# Patient Record
Sex: MALE | URBAN - METROPOLITAN AREA
[De-identification: names, ages, dates, MRNs, and addresses within clinical notes are randomized per-mention and may not be internally consistent; named-entity substitution may affect disease eponyms.]

---

## 2017-01-19 ENCOUNTER — MEDICAL CORRESPONDENCE (OUTPATIENT)
Dept: HEALTH INFORMATION MANAGEMENT | Facility: CLINIC | Age: 32
End: 2017-01-19

## 2017-01-19 ENCOUNTER — TRANSFERRED RECORDS (OUTPATIENT)
Dept: HEALTH INFORMATION MANAGEMENT | Facility: CLINIC | Age: 32
End: 2017-01-19

## 2017-02-02 ENCOUNTER — PRE VISIT (OUTPATIENT)
Dept: ORTHOPEDICS | Facility: CLINIC | Age: 32
End: 2017-02-02

## 2017-02-02 NOTE — TELEPHONE ENCOUNTER
1.  Date/reason for appt: 2/13/17 -- lateral patella instability  2.  Referring provider: Angel Espinosa  3.  Call to patient (Yes / No - short description): no, referred  4.  Previous care at / records requested from: [place] -- per referral , records already forwarded to [facility].

## 2017-02-02 NOTE — TELEPHONE ENCOUNTER
12/13/16 note received from [facility], will forward to clinic.  Xray left knee on 3/22/16- [facility]     Additional records at [facility] with Dr. Herrera. Patient has had a arthroscopy.

## 2017-02-03 NOTE — TELEPHONE ENCOUNTER
Called and spoke to pt, he has been seen at [facility] prior to [facility].  Mailed KYE to pt's home address.

## 2017-02-08 NOTE — TELEPHONE ENCOUNTER
Called and spoke to pt to follow up on KYE form, states he did not receive it yet.  He will try to get it back to me before appt time.  If not, pt was instructed to bring form to appt.

## 2017-02-09 NOTE — TELEPHONE ENCOUNTER
Records received from [facility], will forward to clinic. Waiting for images.   Left knee op-note on 1/27/16  Office notes on 11/17/16, 3/22/16, 2/9/16, 1/4/16, 12/17/15, 12/14/15, 11/3/15, 10/23/15, 10/20/15  PT notes on 12/9/15, 12/4/15, 12/2/15, 11/25/15, 11/20/15  Labs   Xray left knee on 3/22/16, 10/19/15  MRI left knee on 10/29/15

## 2017-02-13 ENCOUNTER — OFFICE VISIT (OUTPATIENT)
Dept: ORTHOPEDICS | Facility: CLINIC | Age: 32
End: 2017-02-13

## 2017-02-13 VITALS — WEIGHT: 182 LBS | BODY MASS INDEX: 30.32 KG/M2 | HEIGHT: 65 IN

## 2017-02-13 DIAGNOSIS — M25.562 CHRONIC PAIN OF LEFT KNEE: Primary | ICD-10-CM

## 2017-02-13 DIAGNOSIS — G89.29 CHRONIC PAIN OF LEFT KNEE: Primary | ICD-10-CM

## 2017-02-13 ASSESSMENT — ENCOUNTER SYMPTOMS
JOINT SWELLING: 0
RESPIRATORY PAIN: 0
STIFFNESS: 1
DYSPNEA ON EXERTION: 0
MYALGIAS: 1
POSTURAL DYSPNEA: 0
COUGH: 1
NECK PAIN: 0
MUSCLE CRAMPS: 0
SHORTNESS OF BREATH: 0
SPUTUM PRODUCTION: 1
ARTHRALGIAS: 1
BACK PAIN: 1
WHEEZING: 0
COUGH DISTURBING SLEEP: 0
HEMOPTYSIS: 0
SNORES LOUDLY: 1
MUSCLE WEAKNESS: 0

## 2017-02-13 NOTE — NURSING NOTE
"Reason For Visit:   Chief Complaint   Patient presents with     Musculoskeletal Problem     Left knee, consideration for ACI procedure, DOS: 1/27/16 Dr Myah Herrera for patellar instability     Occupation: .  Currently working? Yes.  Work status?  Full time.  Date of injury: History of chronic patellar dislocations    Date of surgery: 1/27/16  Type of surgery: arthroscopy, chondroplasty, cartilage biopsy  Smoker: Yes    Pain Assessment  Patient Currently in Pain: Yes  Primary Pain Location: Knee  Pain Orientation: Left  Pain Descriptors: Constant  Aggravating Factors: Walking    Ht 1.651 m (5' 5\")  Wt 82.6 kg (182 lb)  BMI 30.29 kg/m2    No current outpatient prescriptions on file.     No current facility-administered medications for this visit.                                                       "

## 2017-02-13 NOTE — PROGRESS NOTES
REFERRING PROVIDER:  Angel Espinosa MD.      CHIEF COMPLAINT:     1.  Left-sided knee pain.   2.  Left patellar instability.      HISTORY OF PRESENT ILLNESS:  Mr. Munir Bolton is a 31-year-old male with a history of knee pain that is secondary to patellar instability.  He had been treated by Dr. Gladis Mendoza in 01/2017 and then at that time had a chondroplasty and ACI biopsy harvest.  At that time there was roughly 2 square cm with grade 3 changes in his patella.  After that, he seemed to do okay for a short time and then underwent multiple dislocations since then.  He has difficulty doing his job secondary to instability or pain.  He then saw Dr. Espinosa who recommended an arthroscopy with the tibial tubercle osteotomy plus/minus MPFL reconstruction and ACI implantation at the undersurface of the patella.      Munir was referred here secondary to insurance reasons.  In the interval time between seeing Dr. Espinosa, he has stopped smoking.  He had questions today regarding the treatment plan and surgery, postoperative plan and his ability to return to work as he works as a .      PAST MEDICAL HISTORY:  Includes a history of Hodgkin's lymphoma that is in remission.      PAST SURGICAL HISTORY:  Includes a port placement and removal.      SOCIAL HISTORY:  Lives in [place] outside of [place].  He works as a ; he has quit smoking in November.      FAMILY HISTORY:  Noncontributory.      REVIEW OF SYSTEMS:  Positive for left knee joint pain and swelling, negative for chest pain, shortness of breath, nausea, vomiting, no numbness or tingling or weight loss.  The remainder of review of systems is negative.      PHYSICAL EXAMINATION:  Examination of the left knee shows skin nondiaphoretic, nonlabored breathing.  Examination of the left knee shows portals that are well-healed from surgery, positive J sign, with extension, crepitus apprehension with attempted displacement of the  patella.  Flexion is greater than 120 degrees, stable to varus and valgus stress and anterior and posterior drawer and Lachman's testing.       IMAGING:  Review of the x-rays does show mild patella dianna.  Review of the MRI from 2015 shows a significant defect to the undersurface of the patellar cartilage with some mild trochlear dysplasia and an intermediate range TT-TG around 18.      ASSESSMENT:  Mr. Bolton is a 31-year-old male with recurrent patellar instability with cartilage damage to the undersurface of the patella.  He also has patella dianna.  At this point, the plan outlined by Dr. Espinosa seems very reasonable.  We find that we would like to get an MRI to make sure he is a surgical candidate for ACI.  We understand that he may have difficulty getting this approved by insurance, but we will do our best to work on getting this approved for him.  If this is not able be improved due to the high cost of ACI, we may be able to come up with an alternative plan for cartilage restoration.      We will plan on trying to get an MRI today with 3T magnum and then discuss results with the patient over the phone so he does not have to make the total 9 hour commute to come her and back.  We can then discuss any further treatment plan going forward.      The patient was seen and examined with Dr. Connors.      cc: Angel Espinosa MD

## 2017-02-13 NOTE — MR AVS SNAPSHOT
"              After Visit Summary   2017    Munir Bolton    MRN: [id number]           Patient Information     Date Of Birth          1985        Visit Information        Provider Department      2017 12:30 PM Angelo Connors MD [facility]        Today's Diagnoses     Chronic pain of left knee    -  1       Follow-ups after your visit        Who to contact     Please call your clinic at [phone] to:    Ask questions about your health    Make or cancel appointments    Discuss your medicines    Learn about your test results    Speak to your doctor   If you have compliments or concerns about an experience at your clinic, or if you wish to file a complaint, please contact [facility] Patient Relations at [phone] or email us at [email or web address]         Additional Information About Your Visit        MyChart Information     Fixmo is an electronic gateway that provides easy, online access to your medical records. With Fixmo, you can request a clinic appointment, read your test results, renew a prescription or communicate with your care team.     To sign up for [employer] visit the website at [email or web address]   You will be asked to enter the access code listed below, as well as some personal information. Please follow the directions to create your username and password.     Your access code is: [email or web address]  Expires: 2017  6:30 AM     Your access code will  in 90 days. If you need help or a new code, please contact your [facility] or call [phone] for assistance.        Care EveryWhere ID     This is your Care EveryWhere ID. This could be used by other organizations to access your [facility] medical records  [id number]        Your Vitals Were     Height BMI (Body Mass Index)                1.651 m (5' 5\") 30.29 kg/m2           Blood Pressure from Last 3 Encounters:   No " data found for BP    Weight from Last 3 Encounters:   02/13/17 82.6 kg (182 lb)              We Performed the Following     [facility]-Operative Worksheet        Primary Care Provider    None Specified       No primary provider on file.        Thank you!     Thank you for choosing [facility]  for your care. Our goal is always to provide you with excellent care. Hearing back from our patients is one way we can continue to improve our services. Please take a few minutes to complete the written survey that you may receive in the mail after your visit with us. Thank you!             Your Updated Medication List - Protect others around you: Learn how to safely use, store and throw away your medicines at www.disposemymeds.org.      Notice  As of 2/13/2017 11:59 PM    You have not been prescribed any medications.

## 2017-02-13 NOTE — PROGRESS NOTES
Patient seen and examined. Agree with history, physical and imaging as well as Assessment and Plan as detailed by Dr. Hill.    Assesment:  1. Recurrent patellar instability    2. 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 TTTG 18    4. Hardy - IS 1.44; CD 1.125    Plan: New mri to confirm candidacy of cartilage wear    Tenative surgical plan will be: EUA, arthroscopy, mpfl reconstruction with allograft, +/- LRL, TTO, ACI implantation    I was present with the resident during the history and exam.  I discussed the case with the resident and agree with the findings as documented in the assessment and plan.      ADDENDUM:    MRI obtained which shows full thickness cartilage loss to the patella. Will plan to proceed with the surgical plan as described above.

## 2017-02-13 NOTE — LETTER
2/13/2017      RE: Munir Bolton  [address] [address] [address]       Patient seen and examined. Agree with history, physical and imaging as well as Assessment and Plan as detailed by Dr. Hill.    Assesment:  1. Recurrent patellar instability    2. 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 TTTG 18    4. Diamond - IS 1.44; CD 1.125    Plan: New mri to confirm candidacy of cartilage wear    Tenative surgical plan will be: EUA, arthroscopy, mpfl reconstruction with allograft, +/- LRL, TTO, ACI implantation    I was present with the resident during the history and exam.  I discussed the case with the resident and agree with the findings as documented in the assessment and plan.      ADDENDUM:    MRI obtained which shows full thickness cartilage loss to the patella. Will plan to proceed with the surgical plan as described above.    REFERRING PROVIDER:  Angel Espinosa MD.      CHIEF COMPLAINT:     1.  Left-sided knee pain.   2.  Left patellar instability.      HISTORY OF PRESENT ILLNESS:  Mr. Munir Bolton is a 31-year-old male with a history of knee pain that is secondary to patellar instability.  He had been treated by Dr. Gladis Mendoza in 01/2017 and then at that time had a chondroplasty and ACI biopsy harvest.  At that time there was roughly 2 square cm with grade 3 changes in his patella.  After that, he seemed to do okay for a short time and then underwent multiple dislocations since then.  He has difficulty doing his job secondary to instability or pain.  He then saw Dr. Espinosa who recommended an arthroscopy with the tibial tubercle osteotomy plus/minus MPFL reconstruction and ACI implantation at the undersurface of the patella.      Munir was referred here secondary to insurance reasons.  In the interval time between seeing Dr. Espinosa, he has stopped smoking.  He had questions today regarding the treatment plan and surgery, postoperative plan and his ability to return to work as he  works as a .      PAST MEDICAL HISTORY:  Includes a history of Hodgkin's lymphoma that is in remission.      PAST SURGICAL HISTORY:  Includes a port placement and removal.      SOCIAL HISTORY:  Lives in [place] outside of [place].  He works as a ; he has quit smoking in November.      FAMILY HISTORY:  Noncontributory.      REVIEW OF SYSTEMS:  Positive for left knee joint pain and swelling, negative for chest pain, shortness of breath, nausea, vomiting, no numbness or tingling or weight loss.  The remainder of review of systems is negative.      PHYSICAL EXAMINATION:  Examination of the left knee shows skin nondiaphoretic, nonlabored breathing.  Examination of the left knee shows portals that are well-healed from surgery, positive J sign, with extension, crepitus apprehension with attempted displacement of the patella.  Flexion is greater than 120 degrees, stable to varus and valgus stress and anterior and posterior drawer and Lachman's testing.       IMAGING:  Review of the x-rays does show mild patella dianna.  Review of the MRI from 2015 shows a significant defect to the undersurface of the patellar cartilage with some mild trochlear dysplasia and an intermediate range TT-TG around 18.      ASSESSMENT:  Mr. Bolton is a 31-year-old male with recurrent patellar instability with cartilage damage to the undersurface of the patella.  He also has patella dianna.  At this point, the plan outlined by Dr. Espinosa seems very reasonable.  We find that we would like to get an MRI to make sure he is a surgical candidate for ACI.  We understand that he may have difficulty getting this approved by insurance, but we will do our best to work on getting this approved for him.  If this is not able be improved due to the high cost of ACI, we may be able to come up with an alternative plan for cartilage restoration.      We will plan on trying to get an MRI today with 3T magnum and then discuss  results with the patient over the phone so he does not have to make the total 9 hour commute to come her and back.  We can then discuss any further treatment plan going forward.      The patient was seen and examined with Dr. Connors.      cc: MD Angelo Miller MD

## 2017-02-13 NOTE — LETTER
2/13/2017       RE: Munir Bolton  [address] [address] [address]     Dear Colleague,    Thank you for referring your patient, Munir Bolton, to the [facility] at [facility]. Please see a copy of my visit note below.    Patient seen and examined. Agree with history, physical and imaging as well as Assessment and Plan as detailed by Dr. Hill.    Assesment:  1. Recurrent patellar instability    2. 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 TTTG 18    4. Diamond - IS 1.44; CD 1.125    Plan: New mri to confirm candidacy of cartilage wear    Tenative surgical plan will be: EUA, arthroscopy, mpfl reconstruction with allograft, +/- LRL, TTO, ACI implantation    I was present with the resident during the history and exam.  I discussed the case with the resident and agree with the findings as documented in the assessment and plan.      ADDENDUM:    MRI obtained which shows full thickness cartilage loss to the patella. Will plan to proceed with the surgical plan as described above.    REFERRING PROVIDER:  Angel Espinosa MD.      CHIEF COMPLAINT:     1.  Left-sided knee pain.   2.  Left patellar instability.      HISTORY OF PRESENT ILLNESS:  Mr. Munir Bolton is a 31-year-old male with a history of knee pain that is secondary to patellar instability.  He had been treated by Dr. Gladis Mendoza in 01/2017 and then at that time had a chondroplasty and ACI biopsy harvest.  At that time there was roughly 2 square cm with grade 3 changes in his patella.  After that, he seemed to do okay for a short time and then underwent multiple dislocations since then.  He has difficulty doing his job secondary to instability or pain.  He then saw Dr. Espinosa who recommended an arthroscopy with the tibial tubercle osteotomy plus/minus MPFL reconstruction and ACI implantation at the undersurface of the patella.      Munir was referred here secondary to insurance  reasons.  In the interval time between seeing Dr. Espinosa, he has stopped smoking.  He had questions today regarding the treatment plan and surgery, postoperative plan and his ability to return to work as he works as a .      PAST MEDICAL HISTORY:  Includes a history of Hodgkin's lymphoma that is in remission.      PAST SURGICAL HISTORY:  Includes a port placement and removal.      SOCIAL HISTORY:  Lives in [place] outside of [place].  He works as a ; he has quit smoking in November.      FAMILY HISTORY:  Noncontributory.      REVIEW OF SYSTEMS:  Positive for left knee joint pain and swelling, negative for chest pain, shortness of breath, nausea, vomiting, no numbness or tingling or weight loss.  The remainder of review of systems is negative.      PHYSICAL EXAMINATION:  Examination of the left knee shows skin nondiaphoretic, nonlabored breathing.  Examination of the left knee shows portals that are well-healed from surgery, positive J sign, with extension, crepitus apprehension with attempted displacement of the patella.  Flexion is greater than 120 degrees, stable to varus and valgus stress and anterior and posterior drawer and Lachman's testing.       IMAGING:  Review of the x-rays does show mild patella dianna.  Review of the MRI from 2015 shows a significant defect to the undersurface of the patellar cartilage with some mild trochlear dysplasia and an intermediate range TT-TG around 18.      ASSESSMENT:  Mr. Bolton is a 31-year-old male with recurrent patellar instability with cartilage damage to the undersurface of the patella.  He also has patella dianna.  At this point, the plan outlined by Dr. Espinosa seems very reasonable.  We find that we would like to get an MRI to make sure he is a surgical candidate for ACI.  We understand that he may have difficulty getting this approved by insurance, but we will do our best to work on getting this approved for him.  If this is  not able be improved due to the high cost of ACI, we may be able to come up with an alternative plan for cartilage restoration.      We will plan on trying to get an MRI today with 3T magnum and then discuss results with the patient over the phone so he does not have to make the total 9 hour commute to come her and back.  We can then discuss any further treatment plan going forward.      The patient was seen and examined with Dr. Connors.      Again, thank you for allowing me to participate in the care of your patient.      Sincerely,    Angelo Connors MD    cc: Angel Espinosa MD

## 2017-02-24 ENCOUNTER — MEDICAL CORRESPONDENCE (OUTPATIENT)
Dept: HEALTH INFORMATION MANAGEMENT | Facility: CLINIC | Age: 32
End: 2017-02-24

## 2017-03-23 ENCOUNTER — ALLIED HEALTH/NURSE VISIT (OUTPATIENT)
Dept: SURGERY | Facility: CLINIC | Age: 32
End: 2017-03-23

## 2017-03-23 ENCOUNTER — ANESTHESIA EVENT (OUTPATIENT)
Dept: SURGERY | Facility: CLINIC | Age: 32
End: 2017-03-23
Payer: COMMERCIAL

## 2017-03-23 ENCOUNTER — OFFICE VISIT (OUTPATIENT)
Dept: SURGERY | Facility: CLINIC | Age: 32
End: 2017-03-23

## 2017-03-23 VITALS
SYSTOLIC BLOOD PRESSURE: 133 MMHG | BODY MASS INDEX: 29.34 KG/M2 | HEART RATE: 83 BPM | TEMPERATURE: 99 F | HEIGHT: 67 IN | DIASTOLIC BLOOD PRESSURE: 78 MMHG | RESPIRATION RATE: 18 BRPM | OXYGEN SATURATION: 96 % | WEIGHT: 186.9 LBS

## 2017-03-23 DIAGNOSIS — M25.362 PATELLAR INSTABILITY OF LEFT KNEE: ICD-10-CM

## 2017-03-23 DIAGNOSIS — Z01.818 PREOP EXAMINATION: Primary | ICD-10-CM

## 2017-03-23 ASSESSMENT — LIFESTYLE VARIABLES: TOBACCO_USE: 1

## 2017-03-23 NOTE — PATIENT INSTRUCTIONS
Preparing for Your Surgery      Name:  Munir Bolton   MRN:  [id number]   :  1985   Today's Date:  3/23/2017     Arriving for surgery:  Surgery date:  3/24/17  Surgery time:  8:30 AM  Arrival time:  6:00 AM  Please come to:     [facility] [address]  [address] [place]  [address]    - parking is available in front of [facility] from 5:15AM to 8:00PM. If you prefer, park your car in the Green Lot.    -Proceed to the 3rd floor, check in at the Adult Surgery Waiting Lounge. [phone]    If an escort is needed stop at the Information Desk in the lobby. Inform the information person that you are here for surgery. An escort to the Adult Surgery Waiting Lounge will be provided.        What can I eat or drink?  -  You may have solid food or milk products until 8 hours prior to your surgery.  -  You may have water, apple juice or 7up/Sprite until 2 hours prior to your surgery.    Which medicines can I take?  -  Please take these medications the day of surgery: Tylenol if needed    How do I prepare myself?  -  Take two showers: one the night before surgery; and one the morning of surgery.         Use Scrubcare or Hibiclens to wash from neck down.  You may use your own shampoo and conditioner. No other hair products.   -  Do NOT use lotion, powder, deodorant, or antiperspirant the day of your surgery.  -  Do NOT wear any makeup, fingernail polish or jewelry.  -  Do not bring your own medications to the hospital, except for inhalers and eye drops.  -  Bring your ID and insurance card.    Questions or Concerns:  If you have questions or concerns, please call the  Preoperative Assessment Center, Monday-Friday 7AM-7PM:  [phone]

## 2017-03-23 NOTE — ANESTHESIA PREPROCEDURE EVALUATION
"  Anesthesia Evaluation     . Pt has had prior anesthetic. Type: General    No history of anesthetic complications          ROS/MED HX    ENT/Pulmonary:     (+)IMTIAZ risk factors snores loudly, tobacco use, Past use November 2016 packs/day  , . .   (-) recent URI   Neurologic:      (-) migraines   Cardiovascular: Comment: Notes that his heart \"catches\" for a beat at times.  Reports that it has not been happening more frequently, episodes are not sustained.  Denies any associated symptoms.      (+) ----. : . . . :. Irregular Heartbeat/Palpitations, .       METS/Exercise Tolerance: Comment: Activity is limited by left knee pain 4 - Raking leaves, gardening   Hematologic:     (+) History of Transfusion no previous transfusion reaction Other Hematologic Disorder-Hodgkins lymphoma diagnosed in 2011, treated for 6-8 months.  In remission, no recurrences.       Musculoskeletal:   (+) , , other musculoskeletal- Chronic left knee pain      GI/Hepatic:  - neg GI/hepatic ROS      (-) GERD   Renal/Genitourinary:  - ROS Renal section negative       Endo:  - neg endo ROS       Psychiatric:  - neg psychiatric ROS       Infectious Disease:  - neg infectious disease ROS       Malignancy:   (+) Malignancy History of Lymphoma/Leukemia  Lymph CA Remission status post Chemo, 4-5 years ago , had chemotherapy        Other:    (+) H/O Chronic Pain,                   Physical Exam      Airway   Mallampati: III  TM distance: <3 FB  Neck ROM: full  Comment: Small mouth opening    Dental   (+) upper dentures, lower dentures and missing    Cardiovascular   Rhythm and rate: regular and normal  (-) carotid bruit is not present and no peripheral edema    Pulmonary    breath sounds clear to auscultation    Other findings: HB 15.3           PAC Discussion and Assessment    ASA Classification: 2  Case is suitable for: [facility]  Anesthetic techniques and relevant risks discussed: GA, Regional and GA with regional block for post-op pain " control  Invasive monitoring and risk discussed: No  Types:   Possibility and Risk of blood transfusion discussed: No  NPO instructions given:   Additional anesthetic preparation and risks discussed:   Needs early admission to pre-op area:   Other:     PAC Resident/NP Anesthesia Assessment:  Munir Bolton is a 31 year old male scheduled to undergo Left Knee Exam Under Anesthesia, Left Knee Arthroscopy, Lateral Retinacular Lengthening, Medial Patellofemoral Ligament Reconstruction with Allograft, Tibial Tubercle Osteotomies, ACI Implantation on 3/24/17 with Dr. Angelo Connors.    He has the following specific operative considerations:   1.  Potential for difficult airway: As the patient has a small mouth opening; a rather large tongue (although he has no teeth); a TM distance <3; a rather short, thick neck, and slightly limited neck extension, recommend that extra caution be exercised during advanced airway maneuvers.   2.  The patient is a relatively young, healthy, physically active, non-smoking male who is preparing for a relatively low-risk surgery.  No further evaluation needed prior to this procedure.    Revised Cardiac Risk Index: 0.4% risk of major adverse cardiac event.  Anesthesia considerations: Refer to PAC assessment in the anesthesia records.  VTE risk: 0.5%  IMTIAZ risk: Low  PONV risk score= 1.  (If > 2, anti-emetic intervention is recommended.)        Mid-Level Provider/Resident:   Date:   Time:     Attending Anesthesiologist Anesthesia Assessment:  31 year old for patellar reconstruction in management of chronic patellar dislocation. Patient has prior history of Hodgkins lymphoma, currently in remission. No significant cardiac or pulmonary disease.    Reviewed and Signed by PAC Anesthesiologist  Anesthesiologist: VIVIAN  Date: 3/23/2017  Time:   Pass/Fail: Pass  Disposition:     PAC Pharmacist Assessment:        Pharmacist:   Date:   Time:      Anesthesia Plan      History & Physical Review  History  and physical reviewed and following examination; no interval change.    ASA Status:  2 .    NPO Status:  > 8 hours    Plan for General and ETT with Intravenous and Propofol induction. Maintenance will be Balanced.    PONV prophylaxis:  Ondansetron (or other 5HT-3)       Postoperative Care  Postoperative pain management:  IV analgesics, Oral pain medications and Peripheral nerve block (Single Shot).      Consents  Anesthetic plan, risks, benefits and alternatives discussed with:  Patient..                          .

## 2017-03-23 NOTE — H&P
Pre-Operative H & P     Date of Encounter: 3/23/2017  Primary Care Physician:  No primary care provider on file.    CC: Chronic left knee pain and patellar instability.      HPI:  Munir Bolton is a 31 year old male who presents for pre-operative H & P in preparation for Left Knee Exam Under Anesthesia, Left Knee Arthroscopy, Lateral Retinacular Lengthening, Medial Patellofemoral Ligament Reconstruction with Allograft, Tibial Tubercle Osteotomies, ACI Implantation on 3/24/17 with Dr. Angelo Connors at [facility].   The patient was evaluated by Dr. Connors on 2/13/17 in regards to left knee pain and left patellar instability.  He had undergone a chondroplasty and ACI biopsy harvesting procedure in January 2016 at an outside facility.  He initially did well postoperatively, however, he has since had chronic pain and multiple joint dislocations, which makes it difficult for him to perform his duties as a .  At the 2/13 appointment, arrangements were made for further evaluation with MRI.  This has been completed and arrangements are now being made for the above procedures.    History is obtained from the patient and the medical record.    Past Medical History:  Past Medical History:   Diagnosis Date     History of hodgkin's lymphoma     In remission     Past Surgical History:  Past Surgical History:   Procedure Laterality Date      implanted port removal       ARTHROSCOPY KNEE Left 01/2016    included chondroplasy patella and cartilage biopsy     IMPLANTED VASCULAR ACCESS PORT       Hx of Blood transfusions/reactions: History of transfusion, no known reactions.     Hx of abnormal bleeding or anti-platelet use: Denies.    Menstrual history: No LMP for male patient.    Steroid use in the last year: Denies.    Personal or FH of difficulty with anesthesia: Denies.    Prior to admission medications  Current Outpatient Prescriptions   Medication Sig  "Dispense Refill     Acetaminophen (TYLENOL PO) Take 1,000 mg by mouth as needed        Allergies  No Known Allergies    Social History  Social History     Social History     Marital status:      Spouse name: N/A     Number of children: N/A     Years of education: N/A     Occupational History     Not on file.     Social History Main Topics     Smoking status: Former Smoker     Packs/day: 0.50     Years: 15.00     Types: Cigarettes     Quit date: 11/15/2016     Smokeless tobacco: Not on file     Alcohol use Yes      Comment: 3-4 rum and Cokes per month     Drug use: No     Sexual activity: Not on file     Other Topics Concern     Not on file     Social History Narrative     Family History  Family History   Problem Relation Age of Onset     Rheumatoid Arthritis Mother      CANCER Father        Review of Systems  Functional status: Independent in ADL's.  4 METS.     The complete review of systems is negative other than noted in the HPI or here.   Constitutional: Denies recent changes in weight, sleeping patterns, or fevers/chills.  Eyes: No recent vision changes.  EENT: Denies recent changes in hearing, mouth pain, or difficulty swallowing.  Cardiovascular: Denies chest pain, WILD or orthopnea.  Notes that his heart \"catches\" for a beat at times.  Denies an increasing the frequency or duration of these episodes.  Denies any associated symptoms.    Respiratory: Denies shortness of breath or significant cough.    GI: Denies nausea/vomiting or diarrhea/constipation.    : Denies dysuria.    Musculoskeletal: Denies joint pain or swelling, with the exception of chronic left knee pain.    Skin: Denies rashes or wounds.    Hematologic: Denies easy bruising or bleeding.    Neurologic: Denies migraines, seizures, dizziness, numbness/tingling.  Psychiatric: Denies changes in mood or affect.      /78  Pulse 83  Temp 99  F (37.2  C) (Oral)  Resp 18  Ht 1.702 m (5' 7\")  Wt 84.8 kg (186 lb 14.4 oz)  SpO2 96%  BMI " "29.27 kg/m2    186 lbs 14.4 oz  5' 7\"   Body mass index is 29.27 kg/(m^2).    Physical Exam  Constitutional: Patient awake, seated upright in a chair, in no apparent distress.  Appears stated age.  Eyes: Pupils equal, round and reactive to light.  Extra ocular muscles intact. Sclera clear.  Conjunctiva normal.  HENT: Head normocephalic.  Oral pharynx intact with moist mucous membranes.  Dentition absent.  No thyromegaly appreciated.   Respiratory: Lung sounds clear to auscultation bilaterally.  No rales, rhonchi, or wheezing noted.    Cardiovascular: S1, S2, regular rate and rhythm.  Variability with respirations noted.  No murmurs, rubs, or gallops noted. Radial and pedal pulses palpable, bilaterally.  No edema noted.   GI: Bowel sounds present.  Abdomen rounded, soft, non-tender to light palpation.  No hepatosplenomegaly or masses palpated.   Genitourinary: Exam deferred.  Lymph/Hematologic: No cervical or supraclavicular lymphadenopathy noted.  No excessive bruising noted.    Skin: Color appropriate for race, warm, dry.  No rashes or wounds at anticipated surgical site.   Musculoskeletal: Full extension of the neck.  No redness, warmth, or swelling of the joints noted. Gross motor strength is normal.    Neurologic: Alert, oriented to name, place and time. Cranial nerves II-XII are grossly intact. Gait is slightly antalgic due to left knee pain.       Neuropsychiatric: Calm, cooperative. Normal affect.     Imagin17 MRI Left Knee:  IMPRESSION:   1. Significant near full-thickness cartilage fraying involving the median ridge extending into the medial patellar facet with an area of abnormality measuring 1.7 cm in transverse and 1.2 cm in caudocranial dimensions, respectively. Significant subcortical cystic changes  within the patella. When compared to the MRI dated 10/28/2015, the subcortical cystic changes are new.  2. 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  3. Small joint effusion.  4. " Mild blunting of the body of the medial meniscus.  Outside records from [facility] reviewed.    Assessment and Plan  Munir Bolton is a 31 year old male scheduled to undergo Left Knee Exam Under Anesthesia, Left Knee Arthroscopy, Lateral Retinacular Lengthening, Medial Patellofemoral Ligament Reconstruction with Allograft, Tibial Tubercle Osteotomies, ACI Implantation on 3/24/17 with Dr. Angelo Connors.    He has the following specific operative considerations:   1.  Potential for difficult airway: As the patient has a small mouth opening; a rather large tongue (although he has no teeth); a TM distance <3; a rather short, thick neck, and slightly limited neck extension, recommend that extra caution be exercised during advanced airway maneuvers.   2.  The patient is a relatively young, healthy, physically active, non-smoking male who is preparing for a relatively low-risk surgery.  No further evaluation needed prior to this procedure.    Revised Cardiac Risk Index: 0.4% risk of major adverse cardiac event.  Anesthesia considerations: Refer to PAC assessment in the anesthesia records.  VTE risk: 0.5%  IMTIAZ risk: Low  PONV risk score= 1.  (If > 2, anti-emetic intervention is recommended.)    Talita Brambila NP  Preoperative Assessment Center  [facility]  Phone: [phone]  Fax: [phone]

## 2017-03-23 NOTE — MR AVS SNAPSHOT
After Visit Summary   3/23/2017    Munir Bolton    MRN: [id number]           Patient Information     Date Of Birth          1985        Visit Information        Provider Department      3/23/2017 5:00 PM Rn, [facility] Preoperative Assessment Center        Care Instructions    Preparing for Your Surgery      Name:  Munir Bolton   MRN:  [id number]   :  1985   Today's Date:  3/23/2017     Arriving for surgery:  Surgery date:  3/24/17  Surgery time:  8:30 AM  Arrival time:  6:00 AM  Please come to:     [facility] [address]  [address] [place], [place] [address]  [address]    - parking is available in front of [facility] from 5:15AM to 8:00PM. If you prefer, park your car in the Green Lot.    -Proceed to the 3rd floor, check in at the Adult Surgery Waiting Lounge. [phone]    If an escort is needed stop at the Information Desk in the lobby. Inform the information person that you are here for surgery. An escort to the Adult Surgery Waiting Lounge will be provided.        What can I eat or drink?  -  You may have solid food or milk products until 8 hours prior to your surgery.  -  You may have water, apple juice or 7up/Sprite until 2 hours prior to your surgery.    Which medicines can I take?  -  Please take these medications the day of surgery: Tylenol if needed    How do I prepare myself?  -  Take two showers: one the night before surgery; and one the morning of surgery.         Use Scrubcare or Hibiclens to wash from neck down.  You may use your own shampoo and conditioner. No other hair products.   -  Do NOT use lotion, powder, deodorant, or antiperspirant the day of your surgery.  -  Do NOT wear any makeup, fingernail polish or jewelry.  -  Do not bring your own medications to the hospital, except for inhalers and eye drops.  -  Bring your ID and insurance card.    Questions or Concerns:  If you have questions or concerns, please  call the  Preoperative Assessment Center, Monday-Friday 7AM-7PM:  [phone]                  Follow-ups after your visit        Your next 10 appointments already scheduled     Mar 23, 2017  5:30 PM CDT   (Arrive by 5:15 PM)   PAC EVALUATION with  Pac Connie [address] [place] [address] Preoperative Assessment Center ([facility])    [address]  4th Floor  [facility] [address] [phone]            Mar 24, 2017   Procedure with Angelo Connors MD   [facility] [place], Same Day Surgery (--)    [address] [facility] [address] [phone]              Who to contact     Please call your clinic at [phone] to:    Ask questions about your health    Make or cancel appointments    Discuss your medicines    Learn about your test results    Speak to your doctor   If you have compliments or concerns about an experience at your clinic, or if you wish to file a complaint, please contact [facility] Patient Relations at [phone] or email us at [email or web address]         Additional Information About Your Visit        [employer] Information     [employer] is an electronic gateway that provides easy, online access to your medical records. With [employer], you can request a clinic appointment, read your test results, renew a prescription or communicate with your care team.     To sign up for [employer] visit the website at [email or web address]   You will be asked to enter the access code listed below, as well as some personal information. Please follow the directions to create your username and password.     Your access code is: [email or web address]  Expires: 2017  7:30 AM     Your access code will  in 90 days. If you need help or a new code, please contact your [facility] or call [phone] for assistance.        Care EveryWhere ID     This is your Care EveryWhere ID. This could be used by other organizations  to access your [place] medical records  [id number]         Blood Pressure from Last 3 Encounters:   No data found for BP    Weight from Last 3 Encounters:   02/13/17 82.6 kg (182 lb)              Today, you had the following     No orders found for display       Primary Care Provider    None Specified       No primary provider on file.        Thank you!     Thank you for choosing [facility] PREOPERATIVE ASSESSMENT [place]  for your care. Our goal is always to provide you with excellent care. Hearing back from our patients is one way we can continue to improve our services. Please take a few minutes to complete the written survey that you may receive in the mail after your visit with us. Thank you!             Your Updated Medication List - Protect others around you: Learn how to safely use, store and throw away your medicines at www.disposemymeds.org.          This list is accurate as of: 3/23/17  5:05 PM.  Always use your most recent med list.                   Brand Name Dispense Instructions for use    TYLENOL PO      Take 1,000 mg by mouth as needed

## 2017-03-24 ENCOUNTER — ANESTHESIA (OUTPATIENT)
Dept: SURGERY | Facility: CLINIC | Age: 32
End: 2017-03-24
Payer: COMMERCIAL

## 2017-03-24 ENCOUNTER — SURGERY (OUTPATIENT)
Age: 32
End: 2017-03-24

## 2017-03-24 ENCOUNTER — APPOINTMENT (OUTPATIENT)
Dept: GENERAL RADIOLOGY | Facility: CLINIC | Age: 32
End: 2017-03-24
Attending: ORTHOPAEDIC SURGERY
Payer: COMMERCIAL

## 2017-03-24 ENCOUNTER — HOSPITAL ENCOUNTER (OUTPATIENT)
Facility: CLINIC | Age: 32
Discharge: HOME | End: 2017-03-25
Attending: ORTHOPAEDIC SURGERY | Admitting: ORTHOPAEDIC SURGERY
Payer: COMMERCIAL

## 2017-03-24 DIAGNOSIS — M25.362 PATELLAR INSTABILITY OF LEFT KNEE: Primary | ICD-10-CM

## 2017-03-24 LAB
CREAT SERPL-MCNC: 0.64 MG/DL (ref 0.66–1.25)
GFR SERPL CREATININE-BSD FRML MDRD: ABNORMAL ML/MIN/1.7M2
HGB BLD-MCNC: 15.3 G/DL (ref 13.3–17.7)
POTASSIUM SERPL-SCNC: 4 MMOL/L (ref 3.4–5.3)

## 2017-03-24 PROCEDURE — 27210995 ZZH RX 272: Performed by: ORTHOPAEDIC SURGERY

## 2017-03-24 PROCEDURE — 25000125 ZZHC RX 250: Performed by: ANESTHESIOLOGY

## 2017-03-24 PROCEDURE — C1762 CONN TISS, HUMAN(INC FASCIA): HCPCS | Performed by: ORTHOPAEDIC SURGERY

## 2017-03-24 PROCEDURE — 25000128 H RX IP 250 OP 636: Performed by: ORTHOPAEDIC SURGERY

## 2017-03-24 PROCEDURE — 25000132 ZZH RX MED GY IP 250 OP 250 PS 637: Performed by: ORTHOPAEDIC SURGERY

## 2017-03-24 PROCEDURE — 37000009 ZZH ANESTHESIA TECHNICAL FEE, EACH ADDTL 15 MIN: Performed by: ORTHOPAEDIC SURGERY

## 2017-03-24 PROCEDURE — 25000566 ZZH SEVOFLURANE, EA 15 MIN: Performed by: ORTHOPAEDIC SURGERY

## 2017-03-24 PROCEDURE — 71000014 ZZH RECOVERY PHASE 1 LEVEL 2 FIRST HR: Performed by: ORTHOPAEDIC SURGERY

## 2017-03-24 PROCEDURE — 84132 ASSAY OF SERUM POTASSIUM: CPT | Performed by: NURSE PRACTITIONER

## 2017-03-24 PROCEDURE — 25800025 ZZH RX 258: Performed by: ANESTHESIOLOGY

## 2017-03-24 PROCEDURE — 40000170 ZZH STATISTIC PRE-PROCEDURE ASSESSMENT II: Performed by: ORTHOPAEDIC SURGERY

## 2017-03-24 PROCEDURE — 25000125 ZZHC RX 250: Performed by: ORTHOPAEDIC SURGERY

## 2017-03-24 PROCEDURE — 25000132 ZZH RX MED GY IP 250 OP 250 PS 637: Performed by: ANESTHESIOLOGY

## 2017-03-24 PROCEDURE — C1713 ANCHOR/SCREW BN/BN,TIS/BN: HCPCS | Performed by: ORTHOPAEDIC SURGERY

## 2017-03-24 PROCEDURE — 36000064 ZZH SURGERY LEVEL 4 EA 15 ADDTL MIN - UMMC: Performed by: ORTHOPAEDIC SURGERY

## 2017-03-24 PROCEDURE — C9290 INJ, BUPIVACAINE LIPOSOME: HCPCS | Performed by: ANESTHESIOLOGY

## 2017-03-24 PROCEDURE — 82565 ASSAY OF CREATININE: CPT | Performed by: NURSE PRACTITIONER

## 2017-03-24 PROCEDURE — 85018 HEMOGLOBIN: CPT | Performed by: NURSE PRACTITIONER

## 2017-03-24 PROCEDURE — 25000125 ZZHC RX 250: Performed by: NURSE ANESTHETIST, CERTIFIED REGISTERED

## 2017-03-24 PROCEDURE — 25800025 ZZH RX 258: Performed by: NURSE ANESTHETIST, CERTIFIED REGISTERED

## 2017-03-24 PROCEDURE — 25000128 H RX IP 250 OP 636: Performed by: ANESTHESIOLOGY

## 2017-03-24 PROCEDURE — 36000066 ZZH SURGERY LEVEL 4 W FLUORO 1ST 30 MIN - UMMC: Performed by: ORTHOPAEDIC SURGERY

## 2017-03-24 PROCEDURE — 27210794 ZZH OR GENERAL SUPPLY STERILE: Performed by: ORTHOPAEDIC SURGERY

## 2017-03-24 PROCEDURE — 71000015 ZZH RECOVERY PHASE 1 LEVEL 2 EA ADDTL HR: Performed by: ORTHOPAEDIC SURGERY

## 2017-03-24 PROCEDURE — 25000128 H RX IP 250 OP 636: Performed by: NURSE ANESTHETIST, CERTIFIED REGISTERED

## 2017-03-24 PROCEDURE — 71000027 ZZH RECOVERY PHASE 2 EACH 15 MINS: Performed by: ORTHOPAEDIC SURGERY

## 2017-03-24 PROCEDURE — 36415 COLL VENOUS BLD VENIPUNCTURE: CPT | Performed by: NURSE PRACTITIONER

## 2017-03-24 PROCEDURE — 37000008 ZZH ANESTHESIA TECHNICAL FEE, 1ST 30 MIN: Performed by: ORTHOPAEDIC SURGERY

## 2017-03-24 PROCEDURE — 40000278 XR SURGERY CARM FLUORO LESS THAN 5 MIN: Mod: TC

## 2017-03-24 DEVICE — IMP SCR SYN CORTEX 3.5X45MM SELF TAP SS 204.845: Type: IMPLANTABLE DEVICE | Site: KNEE | Status: FUNCTIONAL

## 2017-03-24 DEVICE — GRAFT BONE CRUSH CANC 15ML 400075: Type: IMPLANTABLE DEVICE | Site: KNEE | Status: FUNCTIONAL

## 2017-03-24 DEVICE — IMPLANTABLE DEVICE: Type: IMPLANTABLE DEVICE | Site: KNEE | Status: FUNCTIONAL

## 2017-03-24 DEVICE — IMP SCR ARTHREX BIOCOMPOSITE INTERFERENCE 7X23MM AR-1370C: Type: IMPLANTABLE DEVICE | Site: KNEE | Status: FUNCTIONAL

## 2017-03-24 DEVICE — IMP ANCHOR ARTHREX 3.0X14MM BIOCOMPOSITE SU TAK AR-1934BCFT: Type: IMPLANTABLE DEVICE | Site: KNEE | Status: FUNCTIONAL

## 2017-03-24 DEVICE — GRAFT BONE PUTTY DBX 05ML 038050: Type: IMPLANTABLE DEVICE | Site: KNEE | Status: FUNCTIONAL

## 2017-03-24 DEVICE — IMP SCR SYN CORTEX 3.5X55MM SELF TAP SS 204.855: Type: IMPLANTABLE DEVICE | Site: KNEE | Status: FUNCTIONAL

## 2017-03-24 RX ORDER — ONDANSETRON 4 MG/1
4 TABLET, ORALLY DISINTEGRATING ORAL EVERY 6 HOURS PRN
Status: DISCONTINUED | OUTPATIENT
Start: 2017-03-24 | End: 2017-03-25 | Stop reason: HOSPADM

## 2017-03-24 RX ORDER — FENTANYL CITRATE 50 UG/ML
25-50 INJECTION, SOLUTION INTRAMUSCULAR; INTRAVENOUS
Status: DISCONTINUED | OUTPATIENT
Start: 2017-03-24 | End: 2017-03-24 | Stop reason: HOSPADM

## 2017-03-24 RX ORDER — OXYCODONE HYDROCHLORIDE 5 MG/1
5-10 TABLET ORAL
Qty: 90 TABLET | Refills: 0 | Status: SHIPPED | OUTPATIENT
Start: 2017-03-24 | End: 2017-05-01

## 2017-03-24 RX ORDER — SODIUM CHLORIDE, SODIUM LACTATE, POTASSIUM CHLORIDE, CALCIUM CHLORIDE 600; 310; 30; 20 MG/100ML; MG/100ML; MG/100ML; MG/100ML
INJECTION, SOLUTION INTRAVENOUS CONTINUOUS
Status: DISCONTINUED | OUTPATIENT
Start: 2017-03-24 | End: 2017-03-24 | Stop reason: HOSPADM

## 2017-03-24 RX ORDER — NALOXONE HYDROCHLORIDE 0.4 MG/ML
.1-.4 INJECTION, SOLUTION INTRAMUSCULAR; INTRAVENOUS; SUBCUTANEOUS
Status: DISCONTINUED | OUTPATIENT
Start: 2017-03-24 | End: 2017-03-24 | Stop reason: HOSPADM

## 2017-03-24 RX ORDER — METOCLOPRAMIDE 10 MG/1
10 TABLET ORAL EVERY 6 HOURS PRN
Status: DISCONTINUED | OUTPATIENT
Start: 2017-03-24 | End: 2017-03-25 | Stop reason: HOSPADM

## 2017-03-24 RX ORDER — NEOSTIGMINE METHYLSULFATE 1 MG/ML
VIAL (ML) INJECTION PRN
Status: DISCONTINUED | OUTPATIENT
Start: 2017-03-24 | End: 2017-03-24

## 2017-03-24 RX ORDER — MORPHINE SULFATE 15 MG/1
15 TABLET, FILM COATED, EXTENDED RELEASE ORAL ONCE
Status: DISCONTINUED | OUTPATIENT
Start: 2017-03-24 | End: 2017-03-24 | Stop reason: HOSPADM

## 2017-03-24 RX ORDER — ACETAMINOPHEN 325 MG/1
975 TABLET ORAL ONCE
Status: COMPLETED | OUTPATIENT
Start: 2017-03-24 | End: 2017-03-24

## 2017-03-24 RX ORDER — DEXAMETHASONE SODIUM PHOSPHATE 4 MG/ML
4 INJECTION, SOLUTION INTRA-ARTICULAR; INTRALESIONAL; INTRAMUSCULAR; INTRAVENOUS; SOFT TISSUE EVERY 10 MIN PRN
Status: DISCONTINUED | OUTPATIENT
Start: 2017-03-24 | End: 2017-03-24 | Stop reason: HOSPADM

## 2017-03-24 RX ORDER — PROPOFOL 10 MG/ML
INJECTION, EMULSION INTRAVENOUS PRN
Status: DISCONTINUED | OUTPATIENT
Start: 2017-03-24 | End: 2017-03-24

## 2017-03-24 RX ORDER — FLUMAZENIL 0.1 MG/ML
0.2 INJECTION, SOLUTION INTRAVENOUS
Status: DISCONTINUED | OUTPATIENT
Start: 2017-03-24 | End: 2017-03-24 | Stop reason: HOSPADM

## 2017-03-24 RX ORDER — PROCHLORPERAZINE MALEATE 5 MG
5-10 TABLET ORAL EVERY 6 HOURS PRN
Status: DISCONTINUED | OUTPATIENT
Start: 2017-03-24 | End: 2017-03-25 | Stop reason: HOSPADM

## 2017-03-24 RX ORDER — MEPERIDINE HYDROCHLORIDE 25 MG/ML
12.5 INJECTION INTRAMUSCULAR; INTRAVENOUS; SUBCUTANEOUS
Status: DISCONTINUED | OUTPATIENT
Start: 2017-03-24 | End: 2017-03-24 | Stop reason: HOSPADM

## 2017-03-24 RX ORDER — ACETAMINOPHEN 325 MG/1
650 TABLET ORAL EVERY 6 HOURS PRN
Status: DISCONTINUED | OUTPATIENT
Start: 2017-03-24 | End: 2017-03-25 | Stop reason: HOSPADM

## 2017-03-24 RX ORDER — METOCLOPRAMIDE HYDROCHLORIDE 5 MG/ML
10 INJECTION INTRAMUSCULAR; INTRAVENOUS EVERY 6 HOURS PRN
Status: DISCONTINUED | OUTPATIENT
Start: 2017-03-24 | End: 2017-03-25 | Stop reason: HOSPADM

## 2017-03-24 RX ORDER — CEFAZOLIN SODIUM 2 G/100ML
2 INJECTION, SOLUTION INTRAVENOUS
Status: COMPLETED | OUTPATIENT
Start: 2017-03-24 | End: 2017-03-24

## 2017-03-24 RX ORDER — ONDANSETRON 4 MG/1
4-8 TABLET, ORALLY DISINTEGRATING ORAL EVERY 8 HOURS PRN
Qty: 4 TABLET | Refills: 0 | Status: SHIPPED | OUTPATIENT
Start: 2017-03-24 | End: 2017-05-01

## 2017-03-24 RX ORDER — LIDOCAINE HYDROCHLORIDE 20 MG/ML
INJECTION, SOLUTION INFILTRATION; PERINEURAL PRN
Status: DISCONTINUED | OUTPATIENT
Start: 2017-03-24 | End: 2017-03-24

## 2017-03-24 RX ORDER — MEPERIDINE HYDROCHLORIDE 25 MG/ML
12.5 INJECTION INTRAMUSCULAR; INTRAVENOUS; SUBCUTANEOUS
Status: DISCONTINUED | OUTPATIENT
Start: 2017-03-24 | End: 2017-03-24

## 2017-03-24 RX ORDER — NALOXONE HYDROCHLORIDE 0.4 MG/ML
.1-.4 INJECTION, SOLUTION INTRAMUSCULAR; INTRAVENOUS; SUBCUTANEOUS
Status: DISCONTINUED | OUTPATIENT
Start: 2017-03-24 | End: 2017-03-24

## 2017-03-24 RX ORDER — ONDANSETRON 4 MG/1
4 TABLET, ORALLY DISINTEGRATING ORAL EVERY 30 MIN PRN
Status: DISCONTINUED | OUTPATIENT
Start: 2017-03-24 | End: 2017-03-24

## 2017-03-24 RX ORDER — ONDANSETRON 2 MG/ML
4 INJECTION INTRAMUSCULAR; INTRAVENOUS EVERY 6 HOURS PRN
Status: DISCONTINUED | OUTPATIENT
Start: 2017-03-24 | End: 2017-03-25 | Stop reason: HOSPADM

## 2017-03-24 RX ORDER — NALOXONE HYDROCHLORIDE 0.4 MG/ML
.1-.4 INJECTION, SOLUTION INTRAMUSCULAR; INTRAVENOUS; SUBCUTANEOUS
Status: DISCONTINUED | OUTPATIENT
Start: 2017-03-24 | End: 2017-03-25 | Stop reason: HOSPADM

## 2017-03-24 RX ORDER — BUPIVACAINE HYDROCHLORIDE AND EPINEPHRINE 2.5; 5 MG/ML; UG/ML
INJECTION, SOLUTION INFILTRATION; PERINEURAL PRN
Status: DISCONTINUED | OUTPATIENT
Start: 2017-03-24 | End: 2017-03-24 | Stop reason: HOSPADM

## 2017-03-24 RX ORDER — ONDANSETRON 4 MG/1
4 TABLET, ORALLY DISINTEGRATING ORAL EVERY 30 MIN PRN
Status: DISCONTINUED | OUTPATIENT
Start: 2017-03-24 | End: 2017-03-24 | Stop reason: HOSPADM

## 2017-03-24 RX ORDER — ONDANSETRON 4 MG/1
4 TABLET, ORALLY DISINTEGRATING ORAL
Status: DISCONTINUED | OUTPATIENT
Start: 2017-03-24 | End: 2017-03-25 | Stop reason: HOSPADM

## 2017-03-24 RX ORDER — MORPHINE SULFATE 15 MG/1
15 TABLET, FILM COATED, EXTENDED RELEASE ORAL EVERY 12 HOURS SCHEDULED
Status: DISCONTINUED | OUTPATIENT
Start: 2017-03-24 | End: 2017-03-25 | Stop reason: HOSPADM

## 2017-03-24 RX ORDER — ONDANSETRON 2 MG/ML
4 INJECTION INTRAMUSCULAR; INTRAVENOUS EVERY 30 MIN PRN
Status: DISCONTINUED | OUTPATIENT
Start: 2017-03-24 | End: 2017-03-24

## 2017-03-24 RX ORDER — CEFAZOLIN SODIUM 1 G/3ML
1 INJECTION, POWDER, FOR SOLUTION INTRAMUSCULAR; INTRAVENOUS SEE ADMIN INSTRUCTIONS
Status: DISCONTINUED | OUTPATIENT
Start: 2017-03-24 | End: 2017-03-24 | Stop reason: HOSPADM

## 2017-03-24 RX ORDER — HYDROMORPHONE HYDROCHLORIDE 1 MG/ML
.3-.5 INJECTION, SOLUTION INTRAMUSCULAR; INTRAVENOUS; SUBCUTANEOUS EVERY 10 MIN PRN
Status: DISCONTINUED | OUTPATIENT
Start: 2017-03-24 | End: 2017-03-24 | Stop reason: HOSPADM

## 2017-03-24 RX ORDER — CALCIUM CARBONATE 500 MG/1
500-1000 TABLET, CHEWABLE ORAL 4 TIMES DAILY PRN
Status: DISCONTINUED | OUTPATIENT
Start: 2017-03-24 | End: 2017-03-25 | Stop reason: HOSPADM

## 2017-03-24 RX ORDER — OXYCODONE HYDROCHLORIDE 5 MG/1
5-10 TABLET ORAL
Status: COMPLETED | OUTPATIENT
Start: 2017-03-24 | End: 2017-03-24

## 2017-03-24 RX ORDER — LABETALOL HYDROCHLORIDE 5 MG/ML
10 INJECTION, SOLUTION INTRAVENOUS
Status: DISCONTINUED | OUTPATIENT
Start: 2017-03-24 | End: 2017-03-24 | Stop reason: HOSPADM

## 2017-03-24 RX ORDER — HYDROXYZINE HYDROCHLORIDE 25 MG/1
25 TABLET, FILM COATED ORAL EVERY 6 HOURS PRN
Status: DISCONTINUED | OUTPATIENT
Start: 2017-03-24 | End: 2017-03-25 | Stop reason: HOSPADM

## 2017-03-24 RX ORDER — AMOXICILLIN 250 MG
1-2 CAPSULE ORAL 2 TIMES DAILY
Qty: 30 TABLET | Refills: 0 | Status: SHIPPED | OUTPATIENT
Start: 2017-03-24 | End: 2017-05-01

## 2017-03-24 RX ORDER — OXYCODONE HYDROCHLORIDE 5 MG/1
5-10 TABLET ORAL
Status: DISCONTINUED | OUTPATIENT
Start: 2017-03-24 | End: 2017-03-25 | Stop reason: HOSPADM

## 2017-03-24 RX ORDER — ACETAMINOPHEN 325 MG/1
650 TABLET ORAL
Status: DISCONTINUED | OUTPATIENT
Start: 2017-03-24 | End: 2017-03-24

## 2017-03-24 RX ORDER — ASPIRIN 81 MG/1
162 TABLET ORAL DAILY
Status: DISCONTINUED | OUTPATIENT
Start: 2017-03-25 | End: 2017-03-25 | Stop reason: HOSPADM

## 2017-03-24 RX ORDER — MORPHINE SULFATE 15 MG/1
15 TABLET, FILM COATED, EXTENDED RELEASE ORAL EVERY 12 HOURS
Qty: 10 TABLET | Refills: 0 | Status: SHIPPED | OUTPATIENT
Start: 2017-03-24 | End: 2017-03-29

## 2017-03-24 RX ORDER — BUPIVACAINE HYDROCHLORIDE AND EPINEPHRINE 2.5; 5 MG/ML; UG/ML
INJECTION, SOLUTION INFILTRATION; PERINEURAL PRN
Status: DISCONTINUED | OUTPATIENT
Start: 2017-03-24 | End: 2017-03-24

## 2017-03-24 RX ORDER — ONDANSETRON 2 MG/ML
INJECTION INTRAMUSCULAR; INTRAVENOUS PRN
Status: DISCONTINUED | OUTPATIENT
Start: 2017-03-24 | End: 2017-03-24

## 2017-03-24 RX ORDER — FENTANYL CITRATE 50 UG/ML
25-50 INJECTION, SOLUTION INTRAMUSCULAR; INTRAVENOUS
Status: DISCONTINUED | OUTPATIENT
Start: 2017-03-24 | End: 2017-03-24

## 2017-03-24 RX ORDER — ACETAMINOPHEN 325 MG/1
650 TABLET ORAL EVERY 4 HOURS PRN
Qty: 100 TABLET | Refills: 0 | Status: SHIPPED | OUTPATIENT
Start: 2017-03-24

## 2017-03-24 RX ORDER — ONDANSETRON 2 MG/ML
4 INJECTION INTRAMUSCULAR; INTRAVENOUS EVERY 30 MIN PRN
Status: DISCONTINUED | OUTPATIENT
Start: 2017-03-24 | End: 2017-03-24 | Stop reason: HOSPADM

## 2017-03-24 RX ORDER — HYDROMORPHONE HYDROCHLORIDE 1 MG/ML
.3-.5 INJECTION, SOLUTION INTRAMUSCULAR; INTRAVENOUS; SUBCUTANEOUS
Status: DISCONTINUED | OUTPATIENT
Start: 2017-03-24 | End: 2017-03-25 | Stop reason: HOSPADM

## 2017-03-24 RX ORDER — SODIUM CHLORIDE, SODIUM LACTATE, POTASSIUM CHLORIDE, CALCIUM CHLORIDE 600; 310; 30; 20 MG/100ML; MG/100ML; MG/100ML; MG/100ML
INJECTION, SOLUTION INTRAVENOUS CONTINUOUS PRN
Status: DISCONTINUED | OUTPATIENT
Start: 2017-03-24 | End: 2017-03-24

## 2017-03-24 RX ORDER — GABAPENTIN 100 MG/1
100 CAPSULE ORAL ONCE
Status: COMPLETED | OUTPATIENT
Start: 2017-03-24 | End: 2017-03-24

## 2017-03-24 RX ORDER — HYDROXYZINE HYDROCHLORIDE 25 MG/1
25 TABLET, FILM COATED ORAL ONCE
Status: COMPLETED | OUTPATIENT
Start: 2017-03-24 | End: 2017-03-24

## 2017-03-24 RX ORDER — GLYCOPYRROLATE 0.2 MG/ML
INJECTION, SOLUTION INTRAMUSCULAR; INTRAVENOUS PRN
Status: DISCONTINUED | OUTPATIENT
Start: 2017-03-24 | End: 2017-03-24

## 2017-03-24 RX ORDER — HYDROXYZINE HYDROCHLORIDE 25 MG/1
25 TABLET, FILM COATED ORAL EVERY 6 HOURS PRN
Qty: 30 TABLET | Refills: 0 | Status: SHIPPED | OUTPATIENT
Start: 2017-03-24 | End: 2017-05-01

## 2017-03-24 RX ORDER — SODIUM CHLORIDE, SODIUM LACTATE, POTASSIUM CHLORIDE, CALCIUM CHLORIDE 600; 310; 30; 20 MG/100ML; MG/100ML; MG/100ML; MG/100ML
INJECTION, SOLUTION INTRAVENOUS CONTINUOUS
Status: DISCONTINUED | OUTPATIENT
Start: 2017-03-24 | End: 2017-03-24

## 2017-03-24 RX ORDER — LABETALOL HYDROCHLORIDE 5 MG/ML
10 INJECTION, SOLUTION INTRAVENOUS
Status: DISCONTINUED | OUTPATIENT
Start: 2017-03-24 | End: 2017-03-24

## 2017-03-24 RX ADMIN — GLYCOPYRROLATE 0.6 MG: 0.2 INJECTION, SOLUTION INTRAMUSCULAR; INTRAVENOUS at 11:41

## 2017-03-24 RX ADMIN — BUPIVACAINE 10 ML: 13.3 INJECTION, SUSPENSION, LIPOSOMAL INFILTRATION at 08:17

## 2017-03-24 RX ADMIN — NEOSTIGMINE METHYLSULFATE 3 MG: 1 INJECTION INTRAMUSCULAR; INTRAVENOUS; SUBCUTANEOUS at 11:41

## 2017-03-24 RX ADMIN — CEFAZOLIN SODIUM 2 G: 2 INJECTION, SOLUTION INTRAVENOUS at 08:45

## 2017-03-24 RX ADMIN — ONDANSETRON 4 MG: 2 INJECTION INTRAMUSCULAR; INTRAVENOUS at 11:23

## 2017-03-24 RX ADMIN — ACETAMINOPHEN 650 MG: 325 TABLET ORAL at 16:56

## 2017-03-24 RX ADMIN — HYDROMORPHONE HYDROCHLORIDE 0.3 MG: 10 INJECTION, SOLUTION INTRAMUSCULAR; INTRAVENOUS; SUBCUTANEOUS at 13:01

## 2017-03-24 RX ADMIN — HYDROMORPHONE HYDROCHLORIDE 0.5 MG: 10 INJECTION, SOLUTION INTRAMUSCULAR; INTRAVENOUS; SUBCUTANEOUS at 17:35

## 2017-03-24 RX ADMIN — CEFAZOLIN SODIUM 1 G: 2 INJECTION, SOLUTION INTRAVENOUS at 10:42

## 2017-03-24 RX ADMIN — LIDOCAINE HYDROCHLORIDE 20 MG: 20 INJECTION, SOLUTION INFILTRATION; PERINEURAL at 08:36

## 2017-03-24 RX ADMIN — FENTANYL CITRATE 50 MCG: 50 INJECTION, SOLUTION INTRAMUSCULAR; INTRAVENOUS at 10:33

## 2017-03-24 RX ADMIN — MIDAZOLAM 1 MG: 1 INJECTION INTRAMUSCULAR; INTRAVENOUS at 08:15

## 2017-03-24 RX ADMIN — PROCHLORPERAZINE EDISYLATE 10 MG: 5 INJECTION INTRAMUSCULAR; INTRAVENOUS at 18:41

## 2017-03-24 RX ADMIN — FENTANYL CITRATE 25 MCG: 50 INJECTION, SOLUTION INTRAMUSCULAR; INTRAVENOUS at 12:39

## 2017-03-24 RX ADMIN — HYDROMORPHONE HYDROCHLORIDE 0.5 MG: 10 INJECTION, SOLUTION INTRAMUSCULAR; INTRAVENOUS; SUBCUTANEOUS at 18:41

## 2017-03-24 RX ADMIN — PHENYLEPHRINE HYDROCHLORIDE 100 MCG: 10 INJECTION, SOLUTION INTRAMUSCULAR; INTRAVENOUS; SUBCUTANEOUS at 10:01

## 2017-03-24 RX ADMIN — PHENYLEPHRINE HYDROCHLORIDE 100 MCG: 10 INJECTION, SOLUTION INTRAMUSCULAR; INTRAVENOUS; SUBCUTANEOUS at 10:11

## 2017-03-24 RX ADMIN — ONDANSETRON 4 MG: 2 INJECTION INTRAMUSCULAR; INTRAVENOUS at 12:57

## 2017-03-24 RX ADMIN — FENTANYL CITRATE 100 MCG: 50 INJECTION, SOLUTION INTRAMUSCULAR; INTRAVENOUS at 08:51

## 2017-03-24 RX ADMIN — SODIUM CHLORIDE, POTASSIUM CHLORIDE, SODIUM LACTATE AND CALCIUM CHLORIDE: 600; 310; 30; 20 INJECTION, SOLUTION INTRAVENOUS at 10:54

## 2017-03-24 RX ADMIN — PROPOFOL 150 MG: 10 INJECTION, EMULSION INTRAVENOUS at 08:36

## 2017-03-24 RX ADMIN — SODIUM CHLORIDE, POTASSIUM CHLORIDE, SODIUM LACTATE AND CALCIUM CHLORIDE 250 ML: 600; 310; 30; 20 INJECTION, SOLUTION INTRAVENOUS at 13:37

## 2017-03-24 RX ADMIN — Medication 25 MG: at 08:45

## 2017-03-24 RX ADMIN — SODIUM CHLORIDE, POTASSIUM CHLORIDE, SODIUM LACTATE AND CALCIUM CHLORIDE: 600; 310; 30; 20 INJECTION, SOLUTION INTRAVENOUS at 07:43

## 2017-03-24 RX ADMIN — FENTANYL CITRATE 50 MCG: 50 INJECTION, SOLUTION INTRAMUSCULAR; INTRAVENOUS at 12:52

## 2017-03-24 RX ADMIN — FENTANYL CITRATE 50 MCG: 50 INJECTION, SOLUTION INTRAMUSCULAR; INTRAVENOUS at 09:03

## 2017-03-24 RX ADMIN — FENTANYL CITRATE 25 MCG: 50 INJECTION, SOLUTION INTRAMUSCULAR; INTRAVENOUS at 12:43

## 2017-03-24 RX ADMIN — ONDANSETRON 4 MG: 2 INJECTION INTRAMUSCULAR; INTRAVENOUS at 17:19

## 2017-03-24 RX ADMIN — BUPIVACAINE HYDROCHLORIDE AND EPINEPHRINE BITARTRATE 10 ML: 2.5; .005 INJECTION, SOLUTION INFILTRATION; PERINEURAL at 08:17

## 2017-03-24 RX ADMIN — MIDAZOLAM 1 MG: 1 INJECTION INTRAMUSCULAR; INTRAVENOUS at 08:13

## 2017-03-24 RX ADMIN — FENTANYL CITRATE 50 MCG: 50 INJECTION, SOLUTION INTRAMUSCULAR; INTRAVENOUS at 08:14

## 2017-03-24 RX ADMIN — HYDROMORPHONE HYDROCHLORIDE 0.4 MG: 10 INJECTION, SOLUTION INTRAMUSCULAR; INTRAVENOUS; SUBCUTANEOUS at 13:31

## 2017-03-24 RX ADMIN — FIBRINOGEN HUMAN AND THROMBIN HUMAN 2 ML: KIT at 10:51

## 2017-03-24 RX ADMIN — GABAPENTIN 100 MG: 100 CAPSULE ORAL at 07:57

## 2017-03-24 RX ADMIN — HYDROXYZINE HYDROCHLORIDE 25 MG: 25 TABLET ORAL at 16:48

## 2017-03-24 RX ADMIN — ACETAMINOPHEN 975 MG: 325 TABLET, FILM COATED ORAL at 07:57

## 2017-03-24 RX ADMIN — BUPIVACAINE HYDROCHLORIDE AND EPINEPHRINE BITARTRATE 30 ML: 2.5; .005 INJECTION, SOLUTION INFILTRATION; PERINEURAL at 09:22

## 2017-03-24 RX ADMIN — HYDROMORPHONE HYDROCHLORIDE 0.5 MG: 10 INJECTION, SOLUTION INTRAMUSCULAR; INTRAVENOUS; SUBCUTANEOUS at 21:40

## 2017-03-24 RX ADMIN — FENTANYL CITRATE 50 MCG: 50 INJECTION, SOLUTION INTRAMUSCULAR; INTRAVENOUS at 12:21

## 2017-03-24 RX ADMIN — OXYCODONE HYDROCHLORIDE 10 MG: 5 TABLET ORAL at 14:50

## 2017-03-24 RX ADMIN — FENTANYL CITRATE 50 MCG: 50 INJECTION, SOLUTION INTRAMUSCULAR; INTRAVENOUS at 11:30

## 2017-03-24 RX ADMIN — HYDROMORPHONE HYDROCHLORIDE 0.3 MG: 10 INJECTION, SOLUTION INTRAMUSCULAR; INTRAVENOUS; SUBCUTANEOUS at 13:21

## 2017-03-24 RX ADMIN — Medication 5 MG: at 08:36

## 2017-03-24 NOTE — OR NURSING
Pt failed phase II recovery will be admited to floor for observation over night. Family and pt agree with plan.

## 2017-03-24 NOTE — ANESTHESIA POSTPROCEDURE EVALUATION
Patient: Munir Bolton    Procedure(s):  Left Knee Exam Under Anesthesia, Left Knee Arthroscopy, Medial Patellofemoral Ligament Reconstruction with Allograft, Tibial Tubercle Osteotomies, ACI Implantation  - Wound Class: I-Clean   - Wound Class: I-Clean   - Wound Class: I-Clean    Diagnosis:Chronic Pain Left Knee   Diagnosis Additional Information: No value filed.    Anesthesia Type:  General, ETT    Note:  Anesthesia Post Evaluation    Patient location during evaluation: PACU  Patient participation: Able to fully participate in evaluation  Level of consciousness: awake and alert  Pain management: adequate  Airway patency: patent  Cardiovascular status: acceptable  Respiratory status: acceptable  Hydration status: acceptable  PONV: none     Anesthetic complications: None          Last vitals:  Vitals:    03/24/17 1330 03/24/17 1345 03/24/17 1400   BP: 109/90 (!) 133/93 132/83   Pulse:      Resp: 14 20 19   Temp:   36.7  C (98.1  F)   SpO2: 95% 93% 93%         Electronically Signed By: Blanca Flaherty MD  March 24, 2017  2:30 PM

## 2017-03-24 NOTE — ANESTHESIA CARE TRANSFER NOTE
Patient: Munir Bolton    Procedure(s):  Left Knee Exam Under Anesthesia, Left Knee Arthroscopy, Medial Patellofemoral Ligament Reconstruction with Allograft, Tibial Tubercle Osteotomies, ACI Implantation  - Wound Class: I-Clean   - Wound Class: I-Clean   - Wound Class: I-Clean    Diagnosis: Chronic Pain Left Knee   Diagnosis Additional Information: No value filed.    Anesthesia Type:   General, ETT     Note:  Airway :Face Mask  Patient transferred to:PACU  Comments: VSS.  Spontaneous respirations.  Moving all extremities.  Drowsy but arousable. Satisfactory anesthetic recovery.      Vitals: (Last set prior to Anesthesia Care Transfer)    CRNA VITALS  3/24/2017 1153 - 3/24/2017 1253      3/24/2017             Resp Rate (set): 10                Electronically Signed By: EFRAIN Bravo CRNA  March 24, 2017  2:14 PM

## 2017-03-24 NOTE — ANESTHESIA PROCEDURE NOTES
Peripheral Nerve Block Procedure Note    Staff:     Anesthesiologist:  REYES GEORGE    Resident/CRNA:  RAS WOODS    Block performed by resident/CRNA in the presence of a teaching physician    Location: Pre-op  Procedure Start/Stop TImes:      3/24/2017 8:08 AM     3/24/2017 8:18 AM    patient identified, IV checked, site marked, risks and benefits discussed, informed consent, monitors and equipment checked, pre-op evaluation, at physician/surgeon's request and post-op pain management      Correct Patient: Yes      Correct Position: Yes      Correct Site: Yes      Correct Procedure: Yes      Correct Laterality:  Yes    Site Marked:  Yes  Procedure details:     Procedure:  Adductor canal    Laterality:  Left    Position:  Supine    Sterile Prep: chloraprep, mask and sterile gloves      Local skin infiltration:  None    Needle:  Insulated    Needle gauge:  21    Needle length (inches):  4    Ultrasound: Yes      Ultrasound used to identify targeted nerve, plexus, or vascular structure and placed a needle adjacent to it      Permanent Image entered into patiient's record      Abnormal pain on injection: No      Blood Aspirated: No      Paresthesias:  No    Bleeding at site: No      Bolus via:  Needle    Infusion Method:  Single Shot    Complications:  None  Assessment/Narrative:     Injection made incrementally with aspirations every (mL):  5     10cc Exparel and 10 cc 0.25% bupivacaine with epi    Discussed with Patient Off-Label use of Liposomal Bupivacaine (Exparel) for Nerve Block.    Relevant risks & benefits were discussed with patient.    All questions were answered and there was agreement to proceed.    Patient signed Off-Label Use of Exparel Consent Form.

## 2017-03-24 NOTE — OR NURSING
Attempt to get up to void and became extremely nauseated/dizzy. Vomited, back to bed. Will admit for uncontrolled pain and PONV.

## 2017-03-24 NOTE — BRIEF OP NOTE
[facility] Orthopedic Brief Operative Note    Pre-operative diagnosis: Left knee patellar instability   Post-operative diagnosis: Same   Procedure: Procedure(s):  ARTHROSCOPY LEFT KNEE WITH PATELLAR REALIGNMENT  ARTHROTOMY TIBIAL TUBERCLE SHIFT  ARTHROTOMY KNEE AUTOGENOUS CARTILAGE IMPLANT (GENZYME)   Surgeon: MD Dory    Assistant(s): Krishna Rick MD   Anesthesia: General endotracheal anesthesia   Estimated blood loss: 50mL   Total IV fluids: (See anesthesia record)   Total urine output: Not measured   Drains:  None   Specimens: None   Implants: See dictated operative report for full details   Findings: See dictated operative report for full details   Complications: None   Disposition: Stable to PACU   Comments:  Plan:  See dictated operative report for full details  Touch down weight bearing left lower extremity with hinged knee brace locked in extension while up.  Ok to work on knee ROM 0-90 with hinged brace unlocked.  CPM to start at 0-30 and progress to 0-90 as tolerated over the next 1-2 weeks.    Deep venous thrombosis prophylaxis -  mg q day x 6 weeks starting POD#1  Start physical therapy  Pain control measures  Dressing Change:Post op day #3, replace with gauze and tubi- or tape    Discharge plan: Plan for discharge home same day, if pain not controlled will admit to outpatient overnight    Follow up:  Follow up with Dr. Connors in 1 week.         Krishna Rikc MD   Orthopaedic Surgery PGY-5  [phone]

## 2017-03-24 NOTE — OR NURSING
"Pt would prefer to be discharged, but pain keeps \"creeping up and gets worse\". Will give visteril and tylenol, then attempt to get ready for discharge. Pt and wife agree with plan.  "

## 2017-03-24 NOTE — IP AVS SNAPSHOT
MRN:[id number]                      After Visit Summary   3/24/2017    Munir Bolton    MRN: [id number]           Thank you!     Thank you for choosing [facility] for your care. Our goal is always to provide you with excellent care. Hearing back from our patients is one way we can continue to improve our services. Please take a few minutes to complete the written survey that you may receive in the mail after you visit with us. Thank you!        Patient Information     Date Of Birth          1985        About your hospital stay     You were admitted on:  March 24, 2017 You last received care in the:  [facility]    You were discharged on:  March 25, 2017        Reason for your hospital stay       You were hospitalized to have your left knee surgery                  Who to Call     For medical emergencies, please call 911.  For non-urgent questions about your medical care, please call your primary care provider or clinic, None  For questions related to your surgery, please call your surgery clinic        Attending Provider     Provider Specialty    Angelo Connors MD Orthopedics       Primary Care Provider    Physician No Ref-Primary       No address on file         When to contact your care team       Call your primary doctor if you have any of the following: temperature greater than 101.4,  increased shortness of breath,     Call orthopaedics if you experience fever greater than 101.4 degrees F, increased drainage, increased swelling or increased pain or other concerns that relate to your surgical site.                  After Care Instructions      Diet as Tolerated       Return to diet before surgery, unless instructed otherwise.            Activity       Your activity upon discharge: Touch down weight bearing left lower extremity with hinged knee brace locked in extension while up. Ok to work on knee ROM 0-90 with hinged brace unlocked. CPM to start at 0-30 and progress to 0-90 as  tolerated over the next 1-2 weeks.            Diet       Follow this diet upon discharge: Regular            Diet Instructions       Resume pre-procedure diet            Discharge Instructions       Review outpatient procedure discharge instructions with patient as directed by Provider  Touch down weight bearing left lower extremity with hinged knee brace locked in extension while up.  Ok to work on knee ROM 0-90 with hinged brace unlocked.  CPM to start at 0-30 and progress to 0-90 as tolerated over the next 1-2 weeks.      CPM to be on for 1-2 hours at a time, 3-4 times per day.            Discharge Instructions       Follow up appointment as instructed by Surgeon and or RN.    Touch down weight bearing left lower extremity with hinged knee brace locked in extension while up.  Ok to work on knee ROM 0-90 with hinged brace unlocked.  CPM to start at 0-30 and progress to 0-90 as tolerated over the next 1-2 weeks.            Discharge Instructions       Call your primary doctor if you have any of the following: temperature greater than 101.4,  increased shortness of breath,     Call orthopaedics if you experience fever greater than 101.4 degrees F, increased drainage, increased swelling or increased pain or other concerns that relate to your surgical site.  Instructions to care for your wound at home: as directed.   You may remove dressing 72 hours after surgery at which point it is ok to shower. Water may run over incision, but no scrubbing. If you would like to keep covered, change bandages every other day and keep wound clean and dry.  Replace with gauze and tubi- or tape.  Do not submerge wound in water (pool, spa, sauna, lake, bathtub, etc.) for at least four weeks.            Do not - immerse incision in water until sutures removed       Do not immerse incision in water until sutures removed            Dressing       Keep dressing clean and dry.  Dressing / incisional care as instructed by RN and or  Surgeon    Instructions to care for your wound at home: as directed.   You may remove dressing 72 hours after surgery at which point it is ok to shower. Water may run over incision, but no scrubbing. If you would like to keep covered, change bandages every other day and keep wound clean and dry.  Replace with gauze and tubi- or tape.  Do not submerge wound in water (pool, spa, sauna, lake, bathtub, etc.) for at least four weeks.            Dressing Change       Change dressing on third day after surgery.  Replace with gauze/tape.            Ice to affected area       Ice pack to surgical site every 15 minutes per hour for 24 hours            Ice to affected area       Ice to operative site PRN            No Alcohol       For 24 hours post procedure            No Alcohol       For 24 hours post procedure            No driving or operating machinery        until the day after procedure            Notify Provider       For signs and symptoms of infection: Fever greater than 101, redness, swelling, heat at site, drainage, pus.            Return to clinic       Return to clinic 1 week post op with Dr. Connors.            Shower        Cover dressing if dressing is not going to be changed today            Supplies       List the supplies the pt needs to go home: Gauze, tape.            Weight bearing status - Partial       Touch down weight bearing left lower extremity with hinged knee brace locked in extension while up.  Ok to work on knee ROM 0-90 with hinged brace unlocked.  CPM to start at 0-30 and progress to 0-90 as tolerated over the next 1-2 weeks.            Weight bearing status - Toe touch       Touch down weight bearing left lower extremity with hinged knee brace locked in extension while up.  Ok to work on knee ROM 0-90 with hinged brace unlocked.  CPM to start at 0-30 and progress to 0-90 as tolerated over the next 1-2 weeks.            Wound care       Do not immerse wound in water until sutures  "removed            Wound care and dressings       Instructions to care for your wound at home: Instructions to care for your wound at home: as directed.   You may remove dressing 72 hours after surgery at which point it is ok to shower. Water may run over incision, but no scrubbing. If you would like to keep covered, change bandages every other day and keep wound clean and dry.  Replace with gauze and tubi- or tape.  Do not submerge wound in water (pool, spa, sauna, lake, bathtub, etc.) for at least four weeks.  .                  Follow-up Appointments     Adult [facility] Follow-up and recommended labs and tests       Follow up with Dr. Connors 1 week post op.    Appointments on [facility] and/or [facility] (with [facility] or [facility] provider or service). Call [phone] if you haven't heard regarding these appointments within 7 days of discharge.                  Further instructions from your care team       Information about Liposomal bupivacaine (Exparel)    What is Liposomal bupivacaine?    Exparel is a numbing medication that can help you manage your pain after surgery.  This medication is similar to \"novacaine,\" which is often used by the dentist.  Exparel is released slowly and can help control pain for up to 72 hours.    What is the purpose of Liposomal bupivacaine ?    To manage your pain after surgery    To help you sleep better, take deep breaths, walk more comfortable, and feel up to visiting with others    How is the procedure done?    Liposomal bupivacaine medication by an injection.    It is usually given while you are under sedation right before your surgery.  If this is the case, you will be awake, but you should not experience any pain during the procedure.    For some people, the injection may be given at the very end of you surgery.  It all depends on the type of surgery and your situation.    The procedure usually takes about 15 minutes.  An ultrasound machine will help the anesthesiologist " insert it in the right place.    A needle is used to place the numbing medication under your skin.  It provides pain relief by numbing the tissue in the area where your surgeon will make the incision.    What can I expect?    You may experience numbness, tingling, or a feeling of heaviness around the area that was injected.    The anesthesia team will check on you every day, for 3 days while you are in the hospital.    Let your nurse or anesthesia team know if you experience:       Numbness or tingling occurs in areas other than around the tubing     Blurry vision    Ringing in your ears    A metallic taste in your mouth    If you go home before the end of the 3 days, and experience any of the above symptoms, IMMEDIATELY CALL YOUR ANESTHESIOLOGIST:      Call: Dial [phone].or call [phone]  Let the  know why you are calling and ask them to contact the anesthesiologist right away for you.    You should not receive any other type of numbing medication within 4 days after receiving Liposomal bupivacaine unless your anesthesiologist approves.                Exp 9/2017        Safety Tips for Using Crutches    Crutch Fit:    Assume good standing posture with shoulders relaxed and crutch tips 6-8 inches out from the side of the foot.    The underarm pad should fall 2-3 fingers width below the armpit.    The handgrip is positioned level with the wrist to allow 30  flexion at the elbow.    Safety Tips:    Bear weight on your hands, not on your armpits.    Do not add extra padding to the underarm pad. This will, in effect, lengthen the crutches and increase risk of nerve injury.    Wear flat, properly fitting shoes. Do not walk in stocking feet, high heels or slippers.    Household hazards:  --Throw rugs should be removed from floors.  --Stairs should be cleared of obstacles.  --Use extra caution on slippery, highly polished, littered or uneven floor surfaces.  --Check for electric cords.    Check crutch tips  "for excessive wear and keep wing nuts tight.    While walking, look forward with  head up  and  eyes open.  Take equal length steps.    Use BOTH crutches.    Stairs Sequence:    UP: \"Good\" leg first, followed by  bad  leg, then crutches.    DOWN: Crutches, followed by  bad  leg, \"good\" leg.     Walking with Crutches:    Move both crutches forward at the same time.    Non-Weight Bearing (NWB):  Hold the involved leg up and swing through the crutches with the involved leg. The involved leg does not touch the floor.    Toe Touch Weight Bearing (TTWB): Move the involved leg forward. Rest it lightly on the floor for balance only. Step through the crutches with the uninvolved leg.    Partial Weight Bearing (PWB): Move the involved leg forward. Step down the weight of the leg only.  Step through the crutches with the uninvolved leg.    Weight Bearing As Tolerated (WBAT): Move the involved leg forward. Put as much pressure through the involved leg as you can tolerate comfortably. Then step through the crutches with the uninvolved leg.    Rev. 4/2014    Same-Day Surgery   Adult Discharge Orders & Instructions     For 24 hours after surgery:  1. Get plenty of rest.  A responsible adult must stay with you for at least 24 hours after you leave the hospital.   2. Pain medication can slow your reflexes. Do not drive or use heavy equipment.  If you have weakness or tingling, don't drive or use heavy equipment until this feeling goes away.  3. Mixing alcohol and pain medication can cause dizziness and slow your breathing. It can even be fatal. Do not drink alcohol while taking pain medication.  4. Avoid strenuous or risky activities.  Ask for help when climbing stairs.   5. You may feel lightheaded.  If so, sit for a few minutes before standing.  Have someone help you get up.   6. If you have nausea (feel sick to your stomach), drink only clear liquids such as apple juice, ginger ale, broth or 7-Up.  Rest may also help.  Be sure to " drink enough fluids.  Move to a regular diet as you feel able. Take pain medications with a small amount of solid food, such as toast or crackers, to avoid nausea.   7. A slight fever is normal. Call the doctor if your fever is over 100 F (37.7 C) (taken under the tongue) or lasts longer than 24 hours.  8. You may have a dry mouth, muscle aches, trouble sleeping or a sore throat.  These symptoms should go away after 24 hours.  9. Do not make important or legal decisions.   Pain Management:      1. Take pain medication (if prescribed) for pain as directed by your physician.        2. WARNING: If the pain medication you have been prescribed contains Tylenol (acetaminophen), DO NOT take additional doses of Tylenol (acetaminophen).     Call your doctor for any of the followin.  Signs of infection (fever, growing tenderness at the surgery site, severe pain, a large amount of drainage or bleeding, foul-smelling drainage, redness, swelling).    2.  It has been over 8 to 10 hours since surgery and you are still not able to urinate (pee).    3.  Headache for over 24 hours.    4.  Numbness, tingling or weakness the day after surgery (if you had spinal anesthesia).  To contact a doctor, call _____________________________________ or:      [phone] and ask for the Resident On Call for:          __________________________________________ (answered 24 hours a day)      Emergency Department:  [facility]: [phone]  [facility]: [phone]               Rev. 10/2014       Pending Results     No orders found from 3/22/2017 to 3/25/2017.            Statement of Approval     Ordered          17 0721  I have reviewed and agree with all the recommendations and orders detailed in this document.  EFFECTIVE NOW     Approved and electronically signed by:  Angelo Connors MD             Admission Information     Date & Time Department Dept. Phone    3/24/2017 [facility]  "[phone]      Your Vitals Were     Blood Pressure Pulse Temperature Respirations Height Weight    129/76 (BP Location: Right arm) 84 99.5  F (37.5  C) (Oral) 16 1.676 m (5' 6\") 84 kg (185 lb 3 oz)    Pulse Oximetry BMI (Body Mass Index)                90% 29.89 kg/m2          MyChart Information     Pulse lets you send messages to your doctor, view your test results, renew your prescriptions, schedule appointments and more. To sign up, go to [email or web address] . Click on \"Log in\" on the left side of the screen, which will take you to the Welcome page. Then click on \"Sign up Now\" on the right side of the page.     You will be asked to enter the access code listed below, as well as some personal information. Please follow the directions to create your username and password.     Your access code is: [email or web address]  Expires: 2017  7:30 AM     Your access code will  in 90 days. If you need help or a new code, please call your [place] clinic or [phone].        Care EveryWhere ID     This is your Care EveryWhere ID. This could be used by other organizations to access your [facility] medical records  [id number]           Review of your medicines      START taking        Dose / Directions    aspirin 81 MG EC tablet   Used for:  Patellar instability of left knee        Dose:  162 mg   Take 2 tablets (162 mg) by mouth daily   Quantity:  84 tablet   Refills:  0       hydrOXYzine 25 MG tablet   Commonly known as:  ATARAX   Used for:  Patellar instability of left knee        Dose:  25 mg   Take 1 tablet (25 mg) by mouth every 6 hours as needed for itching (and nausea)   Quantity:  30 tablet   Refills:  0       morphine 15 MG 12 hr tablet   Commonly known as:  MS CONTIN   Used for:  Patellar instability of left knee        Dose:  15 mg   Take 1 tablet (15 mg) by mouth every 12 hours for 5 days maximum 2 tablet(s) per day   Quantity:  10 tablet   Refills:  0       ondansetron 4 MG ODT tab   Commonly known " as:  ZOFRAN-ODT   Used for:  Patellar instability of left knee        Dose:  4-8 mg   Take 1-2 tablets (4-8 mg) by mouth every 8 hours as needed for nausea Dissolve ON the tongue.   Quantity:  4 tablet   Refills:  0       oxyCODONE 5 MG IR tablet   Commonly known as:  ROXICODONE   Used for:  Patellar instability of left knee        Dose:  5-10 mg   Take 1-2 tablets (5-10 mg) by mouth every 3 hours as needed for pain or other (Moderate to Severe)   Quantity:  90 tablet   Refills:  0       senna-docusate 8.6-50 MG per tablet   Commonly known as:  SENOKOT-S;PERICOLACE   Used for:  Patellar instability of left knee        Dose:  1-2 tablet   Take 1-2 tablets by mouth 2 times daily Take while on oral narcotics to prevent or treat constipation.   Quantity:  30 tablet   Refills:  0         CONTINUE these medicines which may have CHANGED, or have new prescriptions. If we are uncertain of the size of tablets/capsules you have at home, strength may be listed as something that might have changed.        Dose / Directions    acetaminophen 325 MG tablet   Commonly known as:  TYLENOL   This may have changed:    - medication strength  - how much to take  - when to take this  - reasons to take this   Used for:  Patellar instability of left knee        Dose:  650 mg   Take 2 tablets (650 mg) by mouth every 4 hours as needed for other (mild pain)   Quantity:  100 tablet   Refills:  0            Where to get your medicines      These medications were sent to [facility] [place], [place] - [address]  [address] [address]     Phone:  [phone]     acetaminophen 325 MG tablet    aspirin 81 MG EC tablet    hydrOXYzine 25 MG tablet    ondansetron 4 MG ODT tab    senna-docusate 8.6-50 MG per tablet         Some of these will need a paper prescription and others can be bought over the counter. Ask your nurse if you have questions.     Bring a paper prescription for each of these medications      morphine 15 MG 12 hr tablet    oxyCODONE 5 MG IR tablet                Protect others around you: Learn how to safely use, store and throw away your medicines at www.disposemymeds.org.             Medication List: This is a list of all your medications and when to take them. Check marks below indicate your daily home schedule. Keep this list as a reference.      Medications           Morning Afternoon Evening Bedtime As Needed    acetaminophen 325 MG tablet   Commonly known as:  TYLENOL   Take 2 tablets (650 mg) by mouth every 4 hours as needed for other (mild pain)   Last time this was given:  650 mg on 3/25/2017  8:59 AM                                aspirin 81 MG EC tablet   Take 2 tablets (162 mg) by mouth daily   Last time this was given:  162 mg on 3/25/2017  8:59 AM                                hydrOXYzine 25 MG tablet   Commonly known as:  ATARAX   Take 1 tablet (25 mg) by mouth every 6 hours as needed for itching (and nausea)   Last time this was given:  25 mg on 3/24/2017  4:48 PM                                morphine 15 MG 12 hr tablet   Commonly known as:  MS CONTIN   Take 1 tablet (15 mg) by mouth every 12 hours for 5 days maximum 2 tablet(s) per day   Last time this was given:  15 mg on 3/25/2017  8:59 AM                                ondansetron 4 MG ODT tab   Commonly known as:  ZOFRAN-ODT   Take 1-2 tablets (4-8 mg) by mouth every 8 hours as needed for nausea Dissolve ON the tongue.                                oxyCODONE 5 MG IR tablet   Commonly known as:  ROXICODONE   Take 1-2 tablets (5-10 mg) by mouth every 3 hours as needed for pain or other (Moderate to Severe)   Last time this was given:  10 mg on 3/25/2017  1:12 PM                                senna-docusate 8.6-50 MG per tablet   Commonly known as:  SENOKOT-S;PERICOLACE   Take 1-2 tablets by mouth 2 times daily Take while on oral narcotics to prevent or treat constipation.

## 2017-03-24 NOTE — DISCHARGE INSTRUCTIONS
"Information about Liposomal bupivacaine (Exparel)    What is Liposomal bupivacaine?    Exparel is a numbing medication that can help you manage your pain after surgery.  This medication is similar to \"novacaine,\" which is often used by the dentist.  Exparel is released slowly and can help control pain for up to 72 hours.    What is the purpose of Liposomal bupivacaine ?    To manage your pain after surgery    To help you sleep better, take deep breaths, walk more comfortable, and feel up to visiting with others    How is the procedure done?    Liposomal bupivacaine medication by an injection.    It is usually given while you are under sedation right before your surgery.  If this is the case, you will be awake, but you should not experience any pain during the procedure.    For some people, the injection may be given at the very end of you surgery.  It all depends on the type of surgery and your situation.    The procedure usually takes about 15 minutes.  An ultrasound machine will help the anesthesiologist insert it in the right place.    A needle is used to place the numbing medication under your skin.  It provides pain relief by numbing the tissue in the area where your surgeon will make the incision.    What can I expect?    You may experience numbness, tingling, or a feeling of heaviness around the area that was injected.    The anesthesia team will check on you every day, for 3 days while you are in the hospital.    Let your nurse or anesthesia team know if you experience:       Numbness or tingling occurs in areas other than around the tubing     Blurry vision    Ringing in your ears    A metallic taste in your mouth    If you go home before the end of the 3 days, and experience any of the above symptoms, IMMEDIATELY CALL YOUR ANESTHESIOLOGIST:      Call: Dial [phone].or call [phone]  Let the  know why you are calling and ask them to contact the anesthesiologist right away for you.    You " "should not receive any other type of numbing medication within 4 days after receiving Liposomal bupivacaine unless your anesthesiologist approves.                Exp 9/2017        Safety Tips for Using Crutches    Crutch Fit:    Assume good standing posture with shoulders relaxed and crutch tips 6-8 inches out from the side of the foot.    The underarm pad should fall 2-3 fingers width below the armpit.    The handgrip is positioned level with the wrist to allow 30  flexion at the elbow.    Safety Tips:    Bear weight on your hands, not on your armpits.    Do not add extra padding to the underarm pad. This will, in effect, lengthen the crutches and increase risk of nerve injury.    Wear flat, properly fitting shoes. Do not walk in stocking feet, high heels or slippers.    Household hazards:  --Throw rugs should be removed from floors.  --Stairs should be cleared of obstacles.  --Use extra caution on slippery, highly polished, littered or uneven floor surfaces.  --Check for electric cords.    Check crutch tips for excessive wear and keep wing nuts tight.    While walking, look forward with  head up  and  eyes open.  Take equal length steps.    Use BOTH crutches.    Stairs Sequence:    UP: \"Good\" leg first, followed by  bad  leg, then crutches.    DOWN: Crutches, followed by  bad  leg, \"good\" leg.     Walking with Crutches:    Move both crutches forward at the same time.    Non-Weight Bearing (NWB):  Hold the involved leg up and swing through the crutches with the involved leg. The involved leg does not touch the floor.    Toe Touch Weight Bearing (TTWB): Move the involved leg forward. Rest it lightly on the floor for balance only. Step through the crutches with the uninvolved leg.    Partial Weight Bearing (PWB): Move the involved leg forward. Step down the weight of the leg only.  Step through the crutches with the uninvolved leg.    Weight Bearing As Tolerated (WBAT): Move the involved leg forward. Put as much " pressure through the involved leg as you can tolerate comfortably. Then step through the crutches with the uninvolved leg.    Rev. 2014    Same-Day Surgery   Adult Discharge Orders & Instructions     For 24 hours after surgery:  1. Get plenty of rest.  A responsible adult must stay with you for at least 24 hours after you leave the hospital.   2. Pain medication can slow your reflexes. Do not drive or use heavy equipment.  If you have weakness or tingling, don't drive or use heavy equipment until this feeling goes away.  3. Mixing alcohol and pain medication can cause dizziness and slow your breathing. It can even be fatal. Do not drink alcohol while taking pain medication.  4. Avoid strenuous or risky activities.  Ask for help when climbing stairs.   5. You may feel lightheaded.  If so, sit for a few minutes before standing.  Have someone help you get up.   6. If you have nausea (feel sick to your stomach), drink only clear liquids such as apple juice, ginger ale, broth or 7-Up.  Rest may also help.  Be sure to drink enough fluids.  Move to a regular diet as you feel able. Take pain medications with a small amount of solid food, such as toast or crackers, to avoid nausea.   7. A slight fever is normal. Call the doctor if your fever is over 100 F (37.7 C) (taken under the tongue) or lasts longer than 24 hours.  8. You may have a dry mouth, muscle aches, trouble sleeping or a sore throat.  These symptoms should go away after 24 hours.  9. Do not make important or legal decisions.   Pain Management:      1. Take pain medication (if prescribed) for pain as directed by your physician.        2. WARNING: If the pain medication you have been prescribed contains Tylenol (acetaminophen), DO NOT take additional doses of Tylenol (acetaminophen).     Call your doctor for any of the followin.  Signs of infection (fever, growing tenderness at the surgery site, severe pain, a large amount of drainage or bleeding,  foul-smelling drainage, redness, swelling).    2.  It has been over 8 to 10 hours since surgery and you are still not able to urinate (pee).    3.  Headache for over 24 hours.    4.  Numbness, tingling or weakness the day after surgery (if you had spinal anesthesia).  To contact a doctor, call _____________________________________ or:      [phone] and ask for the Resident On Call for:          __________________________________________ (answered 24 hours a day)      Emergency Department:  [facility]: [phone]  [place] [facility]: [phone]               Rev. 10/2014

## 2017-03-24 NOTE — OR NURSING
Dr. Flaherty at patient's bedside. Orders to give 250 ml fluid bolus of lactated ringers for patient's tachycardia.

## 2017-03-24 NOTE — IP AVS SNAPSHOT
[facility]A    [address]    [place] [address]    Phone:  [phone]                                       After Visit Summary   3/24/2017    Munir Boltno    MRN: [id number]           After Visit Summary Signature Page     I have received my discharge instructions, and my questions have been answered. I have discussed any challenges I see with this plan with the nurse or doctor.    ..........................................................................................................................................  Patient/Patient Representative Signature      ..........................................................................................................................................  Patient Representative Print Name and Relationship to Patient    ..................................................               ................................................  Date                                            Time    ..........................................................................................................................................  Reviewed by Signature/Title    ...................................................              ..............................................  Date                                                            Time

## 2017-03-25 ENCOUNTER — APPOINTMENT (OUTPATIENT)
Dept: PHYSICAL THERAPY | Facility: CLINIC | Age: 32
End: 2017-03-25
Attending: ORTHOPAEDIC SURGERY
Payer: COMMERCIAL

## 2017-03-25 VITALS
TEMPERATURE: 99.5 F | HEIGHT: 66 IN | SYSTOLIC BLOOD PRESSURE: 129 MMHG | RESPIRATION RATE: 16 BRPM | WEIGHT: 185.19 LBS | DIASTOLIC BLOOD PRESSURE: 76 MMHG | BODY MASS INDEX: 29.76 KG/M2 | HEART RATE: 84 BPM | OXYGEN SATURATION: 90 %

## 2017-03-25 PROCEDURE — 97116 GAIT TRAINING THERAPY: CPT | Mod: GP

## 2017-03-25 PROCEDURE — 97110 THERAPEUTIC EXERCISES: CPT | Mod: GP

## 2017-03-25 PROCEDURE — 40000193 ZZH STATISTIC PT WARD VISIT

## 2017-03-25 PROCEDURE — 25000128 H RX IP 250 OP 636: Performed by: ORTHOPAEDIC SURGERY

## 2017-03-25 PROCEDURE — 97530 THERAPEUTIC ACTIVITIES: CPT | Mod: GP

## 2017-03-25 PROCEDURE — 25000132 ZZH RX MED GY IP 250 OP 250 PS 637: Performed by: ORTHOPAEDIC SURGERY

## 2017-03-25 PROCEDURE — 97161 PT EVAL LOW COMPLEX 20 MIN: CPT | Mod: GP

## 2017-03-25 RX ADMIN — OXYCODONE HYDROCHLORIDE 10 MG: 5 TABLET ORAL at 03:24

## 2017-03-25 RX ADMIN — OXYCODONE HYDROCHLORIDE 10 MG: 5 TABLET ORAL at 00:11

## 2017-03-25 RX ADMIN — ACETAMINOPHEN 650 MG: 325 TABLET, FILM COATED ORAL at 03:25

## 2017-03-25 RX ADMIN — OXYCODONE HYDROCHLORIDE 10 MG: 5 TABLET ORAL at 10:08

## 2017-03-25 RX ADMIN — OXYCODONE HYDROCHLORIDE 10 MG: 5 TABLET ORAL at 13:12

## 2017-03-25 RX ADMIN — ACETAMINOPHEN 650 MG: 325 TABLET, FILM COATED ORAL at 08:59

## 2017-03-25 RX ADMIN — ASPIRIN 162 MG: 81 TABLET, COATED ORAL at 08:59

## 2017-03-25 RX ADMIN — MORPHINE SULFATE 15 MG: 15 TABLET, EXTENDED RELEASE ORAL at 08:59

## 2017-03-25 RX ADMIN — OXYCODONE HYDROCHLORIDE 10 MG: 5 TABLET ORAL at 06:30

## 2017-03-25 NOTE — PLAN OF CARE
Problem: Goal Outcome Summary  Goal: Goal Outcome Summary  Outcome: Adequate for Discharge Date Met:  03/25/17  Pt A/O X 4. Afebrile. VSS. Lungs-clear bilaterally with both anterior and posterior. IS encouraged. Bowels- active in all four quadrants. PP+ DP+. CMS and Neuro's are intact. Pt has numbness in RUE, Ortho MD is aware. Denies numbness and tingling in all other extremities. Denies nausea, shortness of breath, and chest pain. Has pain in the left knee and given prn Oxycodone, prn Tylenol, ICE applied, and is tolerating well. Voids spontaneously without difficulty in the bathroom. Is on a regular diet and appetite was good this shift. Left knee incisional dressing is clean, dry, and intact with hinged brace in place. CPM is ON and set to 35 degrees flexion and 0 degrees extension. Pt up to bathroom and in room with assist of standby. Pt has no PIV access. PCD's on RLE's. Bilateral heels are elevated off the bed. Pt is able to make needs known and the call light is within the pt's reach. Continue to monitor.     Pt. discharged at 1325 to home. Pt. was accompanied by spouse and other family member, and left with personal belongings. Pt. received complete discharge paperwork and all medications as filled by discharge pharmacy. Pt. was given times of last dose for all discharge medications in writing on discharge medication sheets. Discharge teaching included all medication, pain management, activity restrictions, dressing changes, and signs and symptoms of infection. Pt. to follow up with Dr. Connors in 1 week. Pt. had no further questions at the time of discharge and no unmet needs were identified.

## 2017-03-25 NOTE — PROGRESS NOTES
Pt. admitted from PACU at 1800 d/t pain and nausea. Pt. accompanied by his S.O and staff and arrived with personal belongings. Report was taken from Swathi Agosto RN in PACU. Pt. is A&Ox4. VSS. 02 sats= 95% on 2 L NC, dipped to 87% after IV narcotic given. Is on Continuous Oxygen Saturation Monitoring. Pain in the L leg, has ACE wrap on and hinged knee brace as well. CPM in room, pt declined to apply it, stating he wants to rest. Pt wearing personal clothes, declined to remove at this time. Wife in room. Pt. is oriented to the room and call light system and the call light is within the pt's reach. Continue to monitor.

## 2017-03-25 NOTE — PROGRESS NOTES
"Orthopaedic Surgery Progress Note    S: No acute events o/n.  Advancing CPM    O: /84 (BP Location: Left arm)  Pulse 95  Temp 98.8  F (37.1  C) (Oral)  Resp 18  Ht 1.676 m (5' 6\")  Wt 84 kg (185 lb 3 oz)  SpO2 97%  BMI 29.89 kg/m2  Hemoglobin   Date Value Ref Range Status   03/24/2017 15.3 13.3 - 17.7 g/dL Final     Gen: NAD, lying comfortably in bed  CV: RRR  Resp: non labored breathing  LLE:       Sens: SILT s/s/dp/sp/t       Motor: fires ehl/fhl/gsc/ta       Vasc: 2+ DP/PT       Wound: dressings c/d/i      Impression: 31 year old male POD#1 for L knee patellar realignment, autograft  Touch down weight bearing left lower extremity with hinged knee brace locked in extension while up. Ok to work on knee ROM 0-90 with hinged brace unlocked. CPM to start at 0-30 and progress to 0-90 as tolerated over the next 1-2 weeks.   Deep venous thrombosis prophylaxis -  mg q day x 6 weeks starting POD#1  Start physical therapy  Pain control measures  Dressing Change:Post op day #3, replace with gauze and tubi- or tape    Outpatient orders completed. GOSIA today    Vik Ramsey PGY-4  [phone]    "

## 2017-03-25 NOTE — PLAN OF CARE
"Problem: Goal Outcome Summary  Goal: Goal Outcome Summary  Outcome: No Change  VSS. Pt denies N/V but declining to take pain meds. Education on oral pain meds initiated. Pt closed eyes, turned other way and nodded \"yea,yea\". Requesting for IV dilaudid and given. IVSL. Pt up to bathroom with 1 assist and voiding, unmeasured d/t pt not wanting to wait for hat container. CMS intact. Hinge brace on and reminded pt that it needs to be locked when up and OOB of bed. Unable to do full skin assessment as pt declined to take off clothes and declined a hospital gown. Stated not now, in too much pain. Pt also refused CPM d/t the pain but pt resting peacefully in bed. On 2L NC for desatting earlier. Pt also declining frequent post-op VS d/t wanting to sleep. Explained risks and benefits and pt became irritated. VS spot checks WNL. Call light in reach and able to make needs known.      2000 Pt denies N/V and tolerating oral fluids. Pt sleeping well.   2200 Pt denies N/V. C/o pain and medicated with preference of IV dilaudid.       "

## 2017-03-25 NOTE — PROGRESS NOTES
03/25/17 1003   Quick Adds   Type of Visit Initial PT Evaluation       Present no   Language English   Living Environment   Lives With spouse;child(laverne), dependent   Living Arrangements house   Home Accessibility stairs to enter home;stairs (2 railings present)   Number of Stairs to Enter Home 1   Number of Stairs Within Home 0   Transportation Available family or friend will provide   Living Environment Comment Pt lives in a house with his wife and 3 children. His wife will be home to assist x 1 week   Self-Care   Dominant Hand right   Usual Activity Tolerance good   Current Activity Tolerance fair   Regular Exercise no   Equipment Currently Used at Home none  (owns crutches)   Functional Level Prior   Ambulation 0-->independent   Transferring 0-->independent   Toileting 0-->independent   Bathing 0-->independent   Dressing 0-->independent   Eating 0-->independent   Communication 0-->understands/communicates without difficulty   Swallowing 0-->swallows foods/liquids without difficulty   Cognition 0 - no cognition issues reported   Fall history within last six months no   Which of the above functional risks had a recent onset or change? ambulation;transferring   General Information   Onset of Illness/Injury or Date of Surgery - Date 03/25/17   Referring Physician Angelo Connors MD   Patient/Family Goals Statement To go home   Pertinent History of Current Problem (include personal factors and/or comorbidities that impact the POC) POD#1 for L knee patellar realignment, autograft   Precautions/Limitations fall precautions  (HKB)   Weight-Bearing Status - LUE full weight-bearing   Weight-Bearing Status - RUE full weight-bearing   Weight-Bearing Status - LLE toe touch weight-bearing   Weight-Bearing Status - RLE full weight-bearing   General Observations Supine in bed upon arrival, agreeable to PT   General Info Comments IV, CPM   Cognitive Status Examination   Orientation orientation  to person, place and time   Level of Consciousness alert   Follows Commands and Answers Questions 100% of the time;able to follow multistep instructions   Personal Safety and Judgment intact   Memory intact   Pain Assessment   Patient Currently in Pain Yes, see Vital Sign flowsheet   Integumentary/Edema   Integumentary/Edema Comments Pt with normal post-surgical swelling present to L LE   Posture    Posture Not impaired   Range of Motion (ROM)   ROM Comment Did not formally assess, on CPM 0-30. R LE WFL   Strength   Strength Comments Did not formally assess, demonstrates fair contractions with supine exercises, needs Joni to lift L LE. R LE WFL   Bed Mobility   Bed Mobility Comments Pt completes supine<>sit transfer with Joni to SBA to L LE only. Pt is able to manage without assist but increases pain   Transfer Skills   Transfer Comments Completes multiple sit<>stand transfers with SBA and use of crutches   Gait   Gait Comments Pt tolerates short distance ambulation with SBA and use of crutches   Balance   Balance Comments Independent sitting static/dynamic balance, SBA for standing static/dynamic balance with UE support from crutches   Sensory Examination   Sensory Perception Comments Pt endorses R hand numbness following surgery   General Therapy Interventions   Planned Therapy Interventions balance training;bed mobility training;gait training;ROM;strengthening;transfer training;risk factor education;home program guidelines;progressive activity/exercise   Clinical Impression   Criteria for Skilled Therapeutic Intervention yes, treatment indicated   PT Diagnosis impaired functional mobility   Influenced by the following impairments increased post-op pain, precautions, decreased activity tolerance   Functional limitations due to impairments impaired bed mobility, transfers and ambulation   Clinical Presentation Stable/Uncomplicated   Clinical Presentation Rationale POD#1 for L knee patellar realignment, autograft  "mobilizing well post operatively   Clinical Decision Making (Complexity) Low complexity   Therapy Frequency` (1 time eval and treat)   Predicted Duration of Therapy Intervention (days/wks) 1 day   Anticipated Equipment Needs at Discharge (has crutches)   Anticipated Discharge Disposition Home with Assist;Home with Home Therapy   Risk & Benefits of therapy have been explained Yes   Patient, Family & other staff in agreement with plan of care Yes   [facility] TM \"6 Clicks\"   2016, Trustees of [facility], under license to [employer].  All rights reserved.   6 Clicks Short Forms Basic Mobility Inpatient Short Form   [facility]  \"6 Clicks\" V.2 Basic Mobility Inpatient Short Form   1. Turning from your back to your side while in a flat bed without using bedrails? 4 - None   2. Moving from lying on your back to sitting on the side of a flat bed without using bedrails? 4 - None   3. Moving to and from a bed to a chair (including a wheelchair)? 4 - None   4. Standing up from a chair using your arms (e.g., wheelchair, or bedside chair)? 4 - None   5. To walk in hospital room? 4 - None   6. Climbing 3-5 steps with a railing? 3 - A Little   Basic Mobility Raw Score (Score out of 24.Lower scores equate to lower levels of function) 23   Total Evaluation Time   Total Evaluation Time (Minutes) 10     "

## 2017-03-25 NOTE — PLAN OF CARE
Problem: Goal Outcome Summary  Goal: Goal Outcome Summary  PT evaluation and 1 time treatment complete. Educated on CPM and HKB use. Had pt lock/unlock appropriately. Completes supine<>sit transfer multiple times varying from SBA to Joni to L LE dependent on pain (will have spouse assist x 1 week). Completes sit<>stand transfer with SBA and use of crutches. Pt able to tolerate short distance ambulation with crutches, HKB locked and TDWB to NWB maintained throughout. Completes stairs with single rail and single crutch at CGA, will have spouse assist at home. Reviewed car transfer for safe return home. Reviewed HEP to start with, demonstrated good understanding. At this time all goals met. Safe to d/c home with assist from spouse. Recommend home PT for safety evaluation and progression.      Physical Therapy Discharge Summary     Reason for therapy discharge:    All goals and outcomes met, no further needs identified.     Progress towards therapy goal(s). See goals on Care Plan in [facility] electronic health record for goal details.  Goals met     Therapy recommendation(s):    Continued therapy is recommended.  Rationale/Recommendations:  home PT for safety evaluation and progression.

## 2017-03-25 NOTE — PLAN OF CARE
"Problem: Goal Outcome Summary  Goal: Goal Outcome Summary  Outcome: No Change  Pt A&O x's 4. VSS except slight temp. Given prn tylenol. On 2 lpm and sats at 93-96%.  Lungs clear. Denies SOB, CP and nausea.  On  regular diet. Ate a pice of toast and tolerated well. Bowel sounds hypoactive. LBM 03/23. Pt didn't pass gas yet . Voiding adequate amount to the bathroom but pt refused measuring urine. Stated that \" i am fine I don't have any problem.\" incisional dressing CDI. Has HB on and locked when out of bed. Pain well managed with prn oxycodone. PIV infiltrated and removed. CMS intact, denies N/T. Pt slept comfortably between cares. Able to make needs know, call light in reach. Will continue to monitor.             "

## 2017-03-27 NOTE — OP NOTE
DATE OF SURGERY:  03/24/2017      PREOPERATIVE DIAGNOSES:   1.  Full thickness chondral defect, left patella.   2.  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   3.  Tight lateral retinacular structures.      POSTOPERATIVE DIAGNOSES:   1.  Full thickness chondral defect, left patella.   2.  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   3.  Tight lateral retinacular structures.      PROCEDURES:   1.  Examination under anesthesia, left knee.   2.  Left knee arthroscopy.   3.  Tibial tubercle osteotomy with 7 mm anterior medialization on a 50 degree cut.   4.  Medial patellofemoral ligament reconstruction with allograft.   5.  PERLITA implantation, left patella.   6.  Lateral retinacular lengthening.      SURGEON:  Angelo Connors MD      ASSISTANT:  Krishna Rick MD      OPERATIVE INDICATIONS:  Munir Bolton is a pleasant 31-year-old male with known full thickness chondral defect to the undersurface of his patella.  He has had recurrent patellar instability and lateral patellar maltracking.  He was referred to my clinic for cartilage reconstructive surgery.  I discussed with the patient the risks, benefits, complications, techniques and alternatives.  We reviewed the expected course of recovery and alternative treatment options.  Together through a combined decision making approach, we elected to proceed.  He understood the limitations of cartilage surgery and continued some risk of recurrent instability.  Our plan was to perform an anterior medialization to unload the patella as well as decrease the risk of recurrent instability and we also planned for an MPFL reconstruction with allograft to decrease his risk of recurrent instability.  He wanted to detach the patella by lengthening the lateral retinacular ligaments.  The patient was apprised of benefits and desired to proceed.  He had an ACI biopsy performed at an outside hospital and these  cells were determined to be ready to be grown.      OPERATIVE DETAILS:  In the preoperative area, the patient's informed consent was reviewed and desired to proceed.  The left knee was marked.  The patient was in agreement.  He was taken to the operating room where a timeout was performed and all parties were in agreement.  Preoperative antibiotics were given within 1 hour of time of surgery.  He was placed supine on the operating room table, surrendered to LMA anesthesia and examination under anesthesia was performed with the following findings:  Range of motion 0-145.  Stable to varus and valgus stress.  Stable anterior, posterior drawer.  No pivot shift.  At this time, a bump was placed underneath the ipsilateral hip.  Egg crate was placed beneath the well leg.  The left leg was prepped and draped in the usual sterile fashion.  No tourniquet was applied.  After sufficient prepping and draping, standard anterolateral portal was created and diagnostic arthroscopy was performed with following findings:  There were no loose bodies in the suprapatellar pouch, medial gutter, lateral gutter.  Medial femoral condyle, medial tibial plateau, lateral femoral condyle, lateral tibial plateau, medial meniscus, lateral meniscus, ACL, PCL were normal.  Medial patellar facet was largely intact.  Large cartilage defect measuring almost 2 x 2 cm was noted arthroscopically in the central patella.  At this time, the arthroscope was withdrawn, given this large cartilage defect.  An 8-cm incision was made from the mid portion of the patella through the medial aspect of the tibial tubercle, carried down through the skin and subcutaneous tissues.  Meticulous hemostasis was ensured.  Subcutaneous flaps were elevated.  We performed our tibial tubercle osteotomy first where we first marked our proposed resection site 6+ cm in length, marking with a Bovie on the medial aspect of the tibia and then reflecting the anterior compartment  musculature.  Three breakaway guide pins were placed in a posterior, proximal to anterior distal, collinear direction for osteotomy.  We planned for a 50+ degree osteotomy cut to sufficiently anteriorize.  After placement of 3 guide pins, they were broken off.  An oscillating saw was used to complete our osteotomy.  The piece was taken free and periarticular adhesions were removed.  Our osteotomy was completed with an osteotome.  Final osteotomy measurements measured 50 degrees at 6.5 cm in length.  At this time, a medial parapatellar arthrotomy was performed and a lateral retinacular lengthening with care taken to not open the intraarticular structure was then completed.  Allis clamps were placed on the patella and we identified our defect.  It measured 18 mm x 15 mm x 17 mm.  It was largely oval directly over the central ridge.  This was marked first with a knife.  A ring curet was then used to debride it down to subchondral bone and the calcified cartilage layer was removed.  This left a normal surrounding cartilage.  A template was then created.  Our PERLITA cells were then opened and the cells were cut to size.  A thin layer of fibrin glue was placed in the base of this.  Our PERLITA cells were brought up and implanted with care taken to respect the orientation of the cells.  A final layer of glue was placed over the top and allowed to cure.  Gentle pressure with a little cottonoid was completed.  Hemostasis was ensured and there was no bleeding.  At this time, the patella was returned to its native position, flexion, extension was performed and we returned the patella over and we confirmed that there was no instability of the cartilage cells.  At this time, two 3-0 SutureTaks were placed in the upper third of the patella, providing excellent control of the patella.  Perfect lateral x-ray was then obtained.  We identified the Schottle's point on the medial side of the femur.  A Beath pin was placed.  We were  satisfied with its position, and we reamed to a depth of 30 mm.  At this time, our tibial tubercle osteotomy fragment was reapproximated with approximately 6 mm of translation, upper 50-degrees slope.  Two 0.062 K-wires were used to put the fragment in place and three 3.5 mm screws were then placed.  Excellent compression was noted across the osteotomy site.  At this time, a passing suture was placed.  Our semitendinosis allograft which had been thawed and sized to fit through a size 6 sizing guide was placed with a running locking FiberLoop on one end.  The graft was brought up and reduced into the femoral tunnel, with a 6 x 7 x 23 mm BioComposite screw.  The graft was then routed deep to the fascia and a free needle was used to suture in place the medial border of the patella.  A medial imbrication stitch was then performed with 0 FiberWire, oversewing the medial retinaculum and completing a closing of the medial retinaculum.  At this time, our MPFL reconstruction was oversewn with 0 Vicryl over the anterior periosteum.  A layered closure was initiated with 0 Vicryl, 2-0 Vicryl, Monocryl and arthrotomy was closed independently of the distal incision.  Copious irrigation was again performed.  Bone grafting of the osteotomy site was performed, with a slurry of 5 mL of DBX and 5 mL of allograft chips.  Final closure was initiated with 0 Vicryl, 2-0 Vicryl and Monocryl.  Sterile dressings were applied.  The patient awakened per Anesthesia, transferred to recovery room in stable condition with stable vital signs.      COMPLICATIONS:  None apparent.      DRAINS:  None.      SPECIMENS:  None.      ESTIMATED BLOOD LOSS:  50 mL.      TOURNIQUET TIME:  None.  No tourniquet was placed.      POSTOPERATIVE PLAN:  The patient will be toe touch weightbearing x6 weeks.  Range of motion as tolerated 0-90 degrees only during the first 6 weeks.  No flexion past 90 degrees.  He will wear a brace that he should have on and locked when  he is up and around; it can be off or unlocked for sitting.  He will do CPM 0-30, advance as tolerated to a goal of 0-90 degrees, 3 hours t.i.d. for the first week, then 2 hours b.i.d.  Follow up in my clinic in 1 week.  Physical therapy to start then.  Pain medicine will be provided, oxycodone and MS Contin.  Aspirin for DVT prophylaxis.         KATALINA RICHMOND MD             D: 2017 11:28   T: 2017 12:16   MT: al      Name:     ODALYS BALES   MRN:      [id number]        Account:        [id number]   :      1985           Procedure Date: 2017      Document: [id number]

## 2017-03-27 NOTE — DISCHARGE SUMMARY
"ORTHOPAEDIC DISCHARGE SUMMARY - G5    Date of Admission: 3/24/2017  Date of Discharge: 3/25/2017  1:25 PM  Disposition: Home  Staff Physician: Angelo Connors MD     DISCHARGE DIAGNOSIS: (M25.362) Patellar instability of left knee  (primary encounter diagnosis)      PROCEDURES:    Procedure/Surgery Information   Procedure: Procedure(s):  Left Knee Exam Under Anesthesia, Left Knee Arthroscopy, Medial Patellofemoral Ligament Reconstruction with Allograft, Tibial Tubercle Osteotomies, ACI Implantation  - Wound Class: I-Clean   - Wound Class: I-Clean   - Wound Class: I-Clean   Surgeon(s): Surgeon(s) and Role:     * Angelo Connors MD - Primary     * Krishna Rick MD - Resident - Assisting     * Michael Dave MD - Resident - Assisting   Specimens: * No specimens in log *   Non-operative procedures None performed   Date:              3/24/2017       HOSPITAL COURSE:   31 year old male with left knee patellar instability.  Patient elected to undergo the above listed procedures on 3/24/2017 after having treatment options, alternatives, risks, and benefits explained.   Pre-operative antibiotics were given..  Patient was placed on aspirin DVT prophylaxis post-operatively.  He was admitted to outpatient overnight status for pain control.  Hospital course without complication.   At time of discharge, patient was tolerating PO pain control, voiding, and eating a pre-operative diet.  Patient was discharged to home in stable condition.    FOLLOWUP:  Follow up with Dr. Connors in 1 week.    PHYSICAL EXAMINATION:      Most recent Vital Signs: /76 (BP Location: Right arm)  Pulse 84  Temp 99.5  F (37.5  C) (Oral)  Resp 16  Ht 1.676 m (5' 6\")  Wt 84 kg (185 lb 3 oz)  SpO2 90%  BMI 29.89 kg/m2  Gen: NAD, lying comfortably in bed  CV: RRR  Resp: non labored breathing  LLE:  Sens: SILT s/s/dp/sp/t  Motor: fires ehl/fhl/gsc/ta  Vasc: 2+ DP/PT  Wound: dressings c/d/i    PLANNED DISCHARGE ORDERS:     " DVT Prophylaxis: Aspirin 162mg q day starting POD#1 for 6 weeks.          Prior to Admission Medications   Prescriptions Last Dose Informant Patient Reported? Taking?   Acetaminophen (TYLENOL PO) 3/23/2017 at Unknown time  Yes No   Sig: Take 1,000 mg by mouth as needed       Facility-Administered Medications: None         Discharge Medication List as of 3/25/2017 12:54 PM      START taking these medications    Details   oxyCODONE (ROXICODONE) 5 MG IR tablet Take 1-2 tablets (5-10 mg) by mouth every 3 hours as needed for pain or other (Moderate to Severe), Disp-90 tablet, R-0, Local Print      senna-docusate (SENOKOT-S;PERICOLACE) 8.6-50 MG per tablet Take 1-2 tablets by mouth 2 times daily Take while on oral narcotics to prevent or treat constipation., Disp-30 tablet, R-0, E-PrescribeWhile on narcotics      ondansetron (ZOFRAN-ODT) 4 MG ODT tab Take 1-2 tablets (4-8 mg) by mouth every 8 hours as needed for nausea Dissolve ON the tongue., Disp-4 tablet, R-0, E-Prescribe      hydrOXYzine (ATARAX) 25 MG tablet Take 1 tablet (25 mg) by mouth every 6 hours as needed for itching (and nausea), Disp-30 tablet, R-0, E-Prescribe      morphine (MS CONTIN) 15 MG 12 hr tablet Take 1 tablet (15 mg) by mouth every 12 hours for 5 days maximum 2 tablet(s) per day, Disp-10 tablet, R-0, Local Print      aspirin 81 MG EC tablet Take 2 tablets (162 mg) by mouth daily, Disp-84 tablet, R-0, E-Prescribe         CONTINUE these medications which have CHANGED    Details   acetaminophen (TYLENOL) 325 MG tablet Take 2 tablets (650 mg) by mouth every 4 hours as needed for other (mild pain), Disp-100 tablet, R-0, E-Prescribe               Discharge Procedure Orders  Notify Provider   Order Comments: For signs and symptoms of infection: Fever greater than 101, redness, swelling, heat at site, drainage, pus.     Ice to affected area   Order Comments: Ice pack to surgical site every 15 minutes per hour for 24 hours     Shower    Order Comments:  Cover dressing if dressing is not going to be changed today     Dressing Change   Order Comments: Change dressing on third day after surgery.  Replace with gauze/tape.     Wound care   Order Comments: Do not immerse wound in water until sutures removed     Diet as Tolerated   Order Comments: Return to diet before surgery, unless instructed otherwise.     Return to clinic   Order Comments: Return to clinic 1 week post op with Dr. Connors.     No driving or operating machinery    Order Comments: until the day after procedure     No Alcohol   Order Comments: For 24 hours post procedure     Weight bearing status - Toe touch   Order Comments: Touch down weight bearing left lower extremity with hinged knee brace locked in extension while up.  Ok to work on knee ROM 0-90 with hinged brace unlocked.  CPM to start at 0-30 and progress to 0-90 as tolerated over the next 1-2 weeks.     Discharge Instructions   Order Comments: Review outpatient procedure discharge instructions with patient as directed by Provider  Touch down weight bearing left lower extremity with hinged knee brace locked in extension while up.  Ok to work on knee ROM 0-90 with hinged brace unlocked.  CPM to start at 0-30 and progress to 0-90 as tolerated over the next 1-2 weeks.      CPM to be on for 1-2 hours at a time, 3-4 times per day.     Weight bearing status - Partial   Order Comments: Touch down weight bearing left lower extremity with hinged knee brace locked in extension while up.  Ok to work on knee ROM 0-90 with hinged brace unlocked.  CPM to start at 0-30 and progress to 0-90 as tolerated over the next 1-2 weeks.     No Alcohol   Order Comments: For 24 hours post procedure     Diet Instructions   Order Comments: Resume pre-procedure diet     Do not - immerse incision in water until sutures removed   Order Comments: Do not immerse incision in water until sutures removed     Dressing   Order Comments: Keep dressing clean and dry.  Dressing /  incisional care as instructed by RN and or Surgeon    Instructions to care for your wound at home: as directed.   You may remove dressing 72 hours after surgery at which point it is ok to shower. Water may run over incision, but no scrubbing. If you would like to keep covered, change bandages every other day and keep wound clean and dry.  Replace with gauze and tubi- or tape.  Do not submerge wound in water (pool, spa, sauna, lake, bathtub, etc.) for at least four weeks.     Ice to affected area   Order Comments: Ice to operative site PRN     Discharge Instructions   Order Comments: Follow up appointment as instructed by Surgeon and or RN.    Touch down weight bearing left lower extremity with hinged knee brace locked in extension while up.  Ok to work on knee ROM 0-90 with hinged brace unlocked.  CPM to start at 0-30 and progress to 0-90 as tolerated over the next 1-2 weeks.     Reason for your hospital stay   Order Comments: You were hospitalized to have your left knee surgery     Adult [facility] Follow-up and recommended labs and tests   Order Comments: Follow up with Dr. Connors 1 week post op.    Appointments on [place] and/or [facility] (with [facility] or [facility] provider or service). Call [phone] if you haven't heard regarding these appointments within 7 days of discharge.     Activity   Order Comments: Your activity upon discharge: Touch down weight bearing left lower extremity with hinged knee brace locked in extension while up. Ok to work on knee ROM 0-90 with hinged brace unlocked. CPM to start at 0-30 and progress to 0-90 as tolerated over the next 1-2 weeks.   Order Specific Question Answer Comments   Is discharge order? Yes      When to contact your care team   Order Comments: Call your primary doctor if you have any of the following: temperature greater than 101.4,  increased shortness of breath,     Call orthopaedics if you experience fever greater than 101.4 degrees F, increased drainage,  increased swelling or increased pain or other concerns that relate to your surgical site.     Wound care and dressings   Order Comments: Instructions to care for your wound at home: Instructions to care for your wound at home: as directed.   You may remove dressing 72 hours after surgery at which point it is ok to shower. Water may run over incision, but no scrubbing. If you would like to keep covered, change bandages every other day and keep wound clean and dry.  Replace with gauze and tubi- or tape.  Do not submerge wound in water (pool, spa, sauna, lake, bathtub, etc.) for at least four weeks.  .     Discharge Instructions   Order Comments: Call your primary doctor if you have any of the following: temperature greater than 101.4,  increased shortness of breath,     Call orthopaedics if you experience fever greater than 101.4 degrees F, increased drainage, increased swelling or increased pain or other concerns that relate to your surgical site.  Instructions to care for your wound at home: as directed.   You may remove dressing 72 hours after surgery at which point it is ok to shower. Water may run over incision, but no scrubbing. If you would like to keep covered, change bandages every other day and keep wound clean and dry.  Replace with gauze and tubi- or tape.  Do not submerge wound in water (pool, spa, sauna, lake, bathtub, etc.) for at least four weeks.     Supplies   Order Comments: List the supplies the pt needs to go home: Gauze, tape.     Full Code     Diet   Order Comments: Follow this diet upon discharge: Regular   Order Specific Question Answer Comments   Is discharge order? Yes        Krishna Rick MD   Orthopaedic Surgery PGY-5  [phone]

## 2017-03-28 DIAGNOSIS — Z98.890 STATUS POST KNEE SURGERY: Primary | ICD-10-CM

## 2017-04-07 ENCOUNTER — MEDICAL CORRESPONDENCE (OUTPATIENT)
Dept: HEALTH INFORMATION MANAGEMENT | Facility: CLINIC | Age: 32
End: 2017-04-07

## 2017-04-27 DIAGNOSIS — Z48.89 AFTERCARE FOLLOWING SURGERY: Primary | ICD-10-CM

## 2017-05-01 ENCOUNTER — OFFICE VISIT (OUTPATIENT)
Dept: ORTHOPEDICS | Facility: CLINIC | Age: 32
End: 2017-05-01

## 2017-05-01 DIAGNOSIS — G89.29 CHRONIC PAIN OF LEFT KNEE: Primary | ICD-10-CM

## 2017-05-01 DIAGNOSIS — M25.562 CHRONIC PAIN OF LEFT KNEE: Primary | ICD-10-CM

## 2017-05-01 ASSESSMENT — ENCOUNTER SYMPTOMS
FEVER: 0
CHILLS: 0
WEIGHT GAIN: 0
SORE THROAT: 0
HALLUCINATIONS: 0
ALTERED TEMPERATURE REGULATION: 0
NIGHT SWEATS: 0
SINUS PAIN: 0
NECK MASS: 0
SMELL DISTURBANCE: 0
BACK PAIN: 0
STIFFNESS: 0
MUSCLE CRAMPS: 0
MUSCLE WEAKNESS: 1
WEIGHT LOSS: 0
FATIGUE: 1
ARTHRALGIAS: 1
DECREASED APPETITE: 0
TASTE DISTURBANCE: 0
TROUBLE SWALLOWING: 0
JOINT SWELLING: 1
NECK PAIN: 0
POLYPHAGIA: 0
SINUS CONGESTION: 1
HOARSE VOICE: 0
POLYDIPSIA: 0
INCREASED ENERGY: 0
MYALGIAS: 0

## 2017-05-01 NOTE — PROGRESS NOTES
HISTORY OF PRESENT ILLNESS:  Munir returns to my clinic today for interval followup.  He is now 6 weeks status post tibial tubercle osteotomy and ACI implantation of his patella.  He is doing tremendously well.  He is having no pain.  He is off narcotics.  He is ready to return to light duty.      PHYSICAL EXAMINATION:  Pleasant male in no acute distress, articulate and interactive.  Flexion to 125 degrees.  Full extension noted; well-healed surgical incisions.  No redness, no drainage, no suggestion of infection.  Ligament stable, neurovascularly intact.      IMAGING:  AP and lateral radiographs of his knee shows a tibial tubercle osteotomy with hardware and fragment in good position.  No loss of reduction.      CLINICAL ASSESSMENT:  Six weeks status post tibial tubercle osteotomy and ACI implantation of the patella.      PLAN:  I had a long discussion today with Munir.  At this time, I think he is doing tremendously well.  He is now weightbearing as tolerated.  He can wean from his crutches, he can wean from his brace.  Follow up in my clinic in 6 weeks.

## 2017-05-01 NOTE — LETTER
5/1/2017       RE: Munir Bolton  [id number] [address] [address]     Dear Colleague,    Thank you for referring your patient, Munir Bolton, to the [facility] at [facility]. Please see a copy of my visit note below.    HISTORY OF PRESENT ILLNESS:  Munir returns to my clinic today for interval followup.  He is now 6 weeks status post tibial tubercle osteotomy and ACI implantation of his patella.  He is doing tremendously well.  He is having no pain.  He is off narcotics.  He is ready to return to light duty.      PHYSICAL EXAMINATION:  Pleasant male in no acute distress, articulate and interactive.  Flexion to 125 degrees.  Full extension noted; well-healed surgical incisions.  No redness, no drainage, no suggestion of infection.  Ligament stable, neurovascularly intact.      IMAGING:  AP and lateral radiographs of his knee shows a tibial tubercle osteotomy with hardware and fragment in good position.  No loss of reduction.      CLINICAL ASSESSMENT:  Six weeks status post tibial tubercle osteotomy and ACI implantation of the patella.      PLAN:  I had a long discussion today with Munir.  At this time, I think he is doing tremendously well.  He is now weightbearing as tolerated.  He can wean from his crutches, he can wean from his brace.  Follow up in my clinic in 6 weeks.         Again, thank you for allowing me to participate in the care of your patient.      Sincerely,    Angelo Connors MD

## 2017-05-01 NOTE — MR AVS SNAPSHOT
After Visit Summary   2017    Munir Bolton    MRN: [id number]           Patient Information     Date Of Birth          1985        Visit Information        Provider Department      2017 3:00 PM Angelo Connors MD [facility]        Today's Diagnoses     Chronic pain of left knee    -  1       Follow-ups after your visit        Your next 10 appointments already scheduled     2017 11:40 AM CDT   (Arrive by 11:25 AM)   RETURN KNEE with MD PATRICK Aguilar [facility] ([facility] [place])    [address] [place] [id number] [phone]              Who to contact     Please call your clinic at [phone] to:    Ask questions about your health    Make or cancel appointments    Discuss your medicines    Learn about your test results    Speak to your doctor   If you have compliments or concerns about an experience at your clinic, or if you wish to file a complaint, please contact [facility] Patient Relations at [phone] or email us at [email or web address]         Additional Information About Your Visit        MyChart Information     [employer] system[employer] is an electronic gateway that provides easy, online access to your medical records. With [employer], you can request a clinic appointment, read your test results, renew a prescription or communicate with your care team.     To sign up for [employer] system[employer] visit the website at [email or web address]   You will be asked to enter the access code listed below, as well as some personal information. Please follow the directions to create your username and password.     Your access code is: [email or web address]  Expires: 2017  7:30 AM     Your access code will  in 90 days. If you need help or a new code, please contact your [facility] or call [phone] for assistance.        Care  EveryWhere ID     This is your Care EveryWhere ID. This could be used by other organizations to access your [place] medical records  [id number]         Blood Pressure from Last 3 Encounters:   03/25/17 129/76   03/23/17 133/78    Weight from Last 3 Encounters:   03/24/17 84 kg (185 lb 3 oz)   03/23/17 84.8 kg (186 lb 14.4 oz)   02/13/17 82.6 kg (182 lb)              Today, you had the following     No orders found for display         Today's Medication Changes          These changes are accurate as of: 5/1/17 11:59 PM.  If you have any questions, ask your nurse or doctor.               Stop taking these medicines if you haven't already. Please contact your care team if you have questions.     hydrOXYzine 25 MG tablet   Commonly known as:  ATARAX   Stopped by:  Angelo Connors MD           ondansetron 4 MG ODT tab   Commonly known as:  ZOFRAN-ODT   Stopped by:  Angelo Connors MD           oxyCODONE 5 MG IR tablet   Commonly known as:  ROXICODONE   Stopped by:  Angelo Connors MD           senna-docusate 8.6-50 MG per tablet   Commonly known as:  SENOKOT-S;PERICOLACE   Stopped by:  Angelo Connors MD                    Primary Care Provider    No Pcp Confirmed       No address on file        Thank you!     Thank you for choosing [facility]  for your care. Our goal is always to provide you with excellent care. Hearing back from our patients is one way we can continue to improve our services. Please take a few minutes to complete the written survey that you may receive in the mail after your visit with us. Thank you!             Your Updated Medication List - Protect others around you: Learn how to safely use, store and throw away your medicines at www.disposemymeds.org.          This list is accurate as of: 5/1/17 11:59 PM.  Always use your most recent med list.                   Brand Name Dispense Instructions for use    acetaminophen 325 MG  tablet    TYLENOL    100 tablet    Take 2 tablets (650 mg) by mouth every 4 hours as needed for other (mild pain)       aspirin 81 MG EC tablet     84 tablet    Take 2 tablets (162 mg) by mouth daily

## 2017-05-01 NOTE — LETTER
Munir Bolton  [address] [address] [address]  :  1985  MRN:  [id number]          Work Slip      May 1, 2017    Patient:  Munir Bolton    Provider: Dr Angelo Amaral may return to work on 17.    The next clinic appointment is scheduled for (date/time) 6 weeks.    Patient limitations: Light restrictions.

## 2017-05-01 NOTE — LETTER
Date:May 3, 2017      Patient was self referred, no letter generated. Do not send.        [facility] Health Information

## 2017-05-01 NOTE — NURSING NOTE
Reason For Visit:   Chief Complaint   Patient presents with     Surgical Followup     DOS: 3.24.17 for Examination under anesthesia, left knee, Left knee arthroscopy, Tibial tubercle osteotomy with 7 mm anterior medialization on a 50 degree cut, Medial patellofemoral ligament reconstruction with allograft, PERLITA implantation, left patella,  Lateral retinacular lengthening.       Primary MD: Dr Pepe Acosta  Ref. MD: established      Currently working? No.  Date of injury: History of chronic patellar dislocations  Date of surgery: 3.24.17  Type of surgery: Examination under anesthesia, left knee, Left knee arthroscopy, Tibial tubercle osteotomy with 7 mm anterior medialization on a 50 degree cut, Medial patellofemoral ligament reconstruction with allograft, PERLITA implantation, left patella,  Lateral retinacular lengthening..  Smoker: No      Pain Assessment  Patient Currently in Pain: No

## 2017-05-01 NOTE — LETTER
5/1/2017      RE: Munir Bolton  [address] [address] [address]       HISTORY OF PRESENT ILLNESS:  Munir returns to my clinic today for interval followup.  He is now 6 weeks status post tibial tubercle osteotomy and ACI implantation of his patella.  He is doing tremendously well.  He is having no pain.  He is off narcotics.  He is ready to return to light duty.      PHYSICAL EXAMINATION:  Pleasant male in no acute distress, articulate and interactive.  Flexion to 125 degrees.  Full extension noted; well-healed surgical incisions.  No redness, no drainage, no suggestion of infection.  Ligament stable, neurovascularly intact.      IMAGING:  AP and lateral radiographs of his knee shows a tibial tubercle osteotomy with hardware and fragment in good position.  No loss of reduction.      CLINICAL ASSESSMENT:  Six weeks status post tibial tubercle osteotomy and ACI implantation of the patella.      PLAN:  I had a long discussion today with Munir.  At this time, I think he is doing tremendously well.  He is now weightbearing as tolerated.  He can wean from his crutches, he can wean from his brace.  Follow up in my clinic in 6 weeks.         Angelo Connors MD

## 2017-05-01 NOTE — LETTER
Munir Bolton  [address] [address] [address]  :  1985  MRN:  [id number]          Work Slip      May 1, 2017    Patient:  Munir Bolton    Provider: Dr Angelo Amaral may return to work on 17.    The next clinic appointment is scheduled for (date/time) 17    Patient limitations: Weight restrictions

## (undated) DEVICE — SOL NACL 0.9% IRRIG 1000ML BOTTLE 2F7124

## (undated) DEVICE — SOL NACL 0.9% IRRIG 3000ML BAG 2B7477

## (undated) DEVICE — DRAPE MAYO STAND 23X54 8337

## (undated) DEVICE — SU VICRYL 0 CT 36" J358H

## (undated) DEVICE — SYR 10ML FINGER CONTROL W/O NDL 309695

## (undated) DEVICE — GLOVE PROTEXIS BLUE W/NEU-THERA 8.0  2D73EB80

## (undated) DEVICE — SU LOOP #2 FIBERLOOP  AR-7234

## (undated) DEVICE — PAD ARMBOARD FOAM EGGCRATE COVIDEN 3114367

## (undated) DEVICE — PREP CHLORAPREP 26ML TINTED ORANGE  260815

## (undated) DEVICE — TUBING ARTHRO CONMED/LINVATEC PUMP BLUE INFLOW 10K100

## (undated) DEVICE — SOL WATER IRRIG 1000ML BOTTLE 2F7114

## (undated) DEVICE — ESU HOLSTER PLASTIC DISP E2400

## (undated) DEVICE — DRAPE CONVERTORS U-DRAPE 60X72" 8476

## (undated) DEVICE — SU FIBERWIRE 2 38"  AR-7200

## (undated) DEVICE — SPONGE COTTONOID 1/2X1/2" 80-1400

## (undated) DEVICE — SPONGE LAP 18X18" X8435

## (undated) DEVICE — ESU GROUND PAD ADULT W/CORD E7507

## (undated) DEVICE — LIGHT HANDLE X1 31140133

## (undated) DEVICE — SPONGE KITTNER 31001010

## (undated) DEVICE — SYR 30ML LL W/O NDL 302832

## (undated) DEVICE — SPECIMEN CONTAINER 5OZ STERILE 2600SA

## (undated) DEVICE — SUCTION MANIFOLD DORNOCH ULTRA CART UL-CL500

## (undated) DEVICE — SYR BULB IRRIG 50ML LATEX FREE 0035280

## (undated) DEVICE — Device

## (undated) DEVICE — SU MONOCRYL 3-0 PS-1 27" Y936H

## (undated) DEVICE — LINEN TOWEL PACK X5 5464

## (undated) DEVICE — SU ETHIBOND 1 CT-1 30" X425H

## (undated) DEVICE — DRSG STERI STRIP 1/2X4" R1547

## (undated) DEVICE — GOWN IMPERVIOUS SPECIALTY XLG/XLONG 32474

## (undated) DEVICE — SU SILK 0 TIE 6X30" A306H

## (undated) DEVICE — SU NDL MCGOWAN 1/2 1859-6D

## (undated) DEVICE — DRAPE STOCKINETTE IMPERVIOUS 12" 1587

## (undated) DEVICE — GLOVE PROTEXIS POWDER FREE 8.0 ORTHOPEDIC 2D73ET80

## (undated) DEVICE — LINEN ORTHO PACK 5446

## (undated) DEVICE — SU VICRYL 2-0 CT-2 27" UND J269H

## (undated) DEVICE — MITT SURGICAL PREP HAIR REMOVER LATEX FREE SPM100

## (undated) DEVICE — DRAPE C-ARM W/STRAPS 42X72" 07-CA104

## (undated) DEVICE — PEN MARKING SKIN W/LABELS 31145918

## (undated) DEVICE — LINEN DRAPE 54X72" 5467

## (undated) DEVICE — IMP SCR SYN CORTEX 3.5X50MM SELF TAP SS 204.850
Type: IMPLANTABLE DEVICE | Site: KNEE | Status: NON-FUNCTIONAL
Removed: 2017-03-24

## (undated) DEVICE — SPONGE RAY-TEC 4X8" 7318

## (undated) DEVICE — COVER CAMERA IN-LIGHT DISP LT-C02

## (undated) DEVICE — PACK ACL SUPPLEMENT STD

## (undated) DEVICE — STRAP KNEE/BODY 31143004

## (undated) DEVICE — SU VICRYL 0 CT-1 27" UND J260H

## (undated) DEVICE — BLADE SAW SAGITTAL STRK XSHORT 25X9.5X0.6MM 2108-145-000

## (undated) RX ORDER — ACETAMINOPHEN 325 MG/1
TABLET ORAL
Status: DISPENSED
Start: 2017-03-24

## (undated) RX ORDER — OXYCODONE HYDROCHLORIDE 5 MG/1
TABLET ORAL
Status: DISPENSED
Start: 2017-03-24

## (undated) RX ORDER — FENTANYL CITRATE 50 UG/ML
INJECTION, SOLUTION INTRAMUSCULAR; INTRAVENOUS
Status: DISPENSED
Start: 2017-03-24

## (undated) RX ORDER — GABAPENTIN 100 MG/1
CAPSULE ORAL
Status: DISPENSED
Start: 2017-03-24

## (undated) RX ORDER — CEFAZOLIN SODIUM 2 G/100ML
INJECTION, SOLUTION INTRAVENOUS
Status: DISPENSED
Start: 2017-03-24

## (undated) RX ORDER — ONDANSETRON 2 MG/ML
INJECTION INTRAMUSCULAR; INTRAVENOUS
Status: DISPENSED
Start: 2017-03-24

## (undated) RX ORDER — SODIUM CHLORIDE, SODIUM LACTATE, POTASSIUM CHLORIDE, CALCIUM CHLORIDE 600; 310; 30; 20 MG/100ML; MG/100ML; MG/100ML; MG/100ML
INJECTION, SOLUTION INTRAVENOUS
Status: DISPENSED
Start: 2017-03-24